# Patient Record
Sex: FEMALE | URBAN - METROPOLITAN AREA
[De-identification: names, ages, dates, MRNs, and addresses within clinical notes are randomized per-mention and may not be internally consistent; named-entity substitution may affect disease eponyms.]

---

## 2017-12-05 ENCOUNTER — EMERGENCY (EMERGENCY)
Facility: HOSPITAL | Age: 82
LOS: 1 days | Discharge: HOME | End: 2017-12-05
Attending: EMERGENCY MEDICINE | Admitting: EMERGENCY MEDICINE
Payer: MEDICARE

## 2017-12-05 VITALS
RESPIRATION RATE: 16 BRPM | TEMPERATURE: 98 F | SYSTOLIC BLOOD PRESSURE: 133 MMHG | WEIGHT: 117.95 LBS | HEART RATE: 108 BPM | DIASTOLIC BLOOD PRESSURE: 65 MMHG | OXYGEN SATURATION: 98 % | HEIGHT: 62 IN

## 2017-12-05 PROBLEM — Z00.00 ENCOUNTER FOR PREVENTIVE HEALTH EXAMINATION: Status: ACTIVE | Noted: 2017-12-05

## 2017-12-05 PROCEDURE — 99283 EMERGENCY DEPT VISIT LOW MDM: CPT

## 2017-12-05 RX ADMIN — Medication 20 MILLIGRAM(S): at 13:56

## 2017-12-05 NOTE — ED PROVIDER NOTE - OBJECTIVE STATEMENT
87 yo white female, just finished last dose of Augmentin yesterday for right ear infection and awoke this morning with rash to chest, abdomen and back as well as extremities. No fever, chills or SOB. Also full feeling in right ear.

## 2017-12-05 NOTE — ED ADULT NURSE NOTE - OBJECTIVE STATEMENT
received pt in bed #2 Pt A&O c/o right ear pain x 2 weeks just completed Augmentin yesterday Pt also w/ small rash on abdomen Seen by Dr Davis steroids given as charted per order

## 2017-12-10 ENCOUNTER — INPATIENT (INPATIENT)
Facility: HOSPITAL | Age: 82
LOS: 7 days | Discharge: HOME | DRG: 884 | End: 2017-12-18
Attending: INTERNAL MEDICINE | Admitting: INTERNAL MEDICINE
Payer: MEDICARE

## 2017-12-10 VITALS
RESPIRATION RATE: 20 BRPM | TEMPERATURE: 98 F | DIASTOLIC BLOOD PRESSURE: 78 MMHG | OXYGEN SATURATION: 100 % | SYSTOLIC BLOOD PRESSURE: 144 MMHG | HEART RATE: 90 BPM

## 2017-12-10 DIAGNOSIS — F29 UNSPECIFIED PSYCHOSIS NOT DUE TO A SUBSTANCE OR KNOWN PHYSIOLOGICAL CONDITION: ICD-10-CM

## 2017-12-10 DIAGNOSIS — R44.3 HALLUCINATIONS, UNSPECIFIED: ICD-10-CM

## 2017-12-10 DIAGNOSIS — R51 HEADACHE: ICD-10-CM

## 2017-12-10 DIAGNOSIS — Z90.49 ACQUIRED ABSENCE OF OTHER SPECIFIED PARTS OF DIGESTIVE TRACT: Chronic | ICD-10-CM

## 2017-12-10 DIAGNOSIS — R27.0 ATAXIA, UNSPECIFIED: ICD-10-CM

## 2017-12-10 LAB
ALBUMIN SERPL ELPH-MCNC: 4.2 G/DL — SIGNIFICANT CHANGE UP (ref 3.3–5)
ALP SERPL-CCNC: 77 U/L — SIGNIFICANT CHANGE UP (ref 40–120)
ALT FLD-CCNC: 14 U/L RC — SIGNIFICANT CHANGE UP (ref 10–45)
ANION GAP SERPL CALC-SCNC: 15 MMOL/L — SIGNIFICANT CHANGE UP (ref 5–17)
AST SERPL-CCNC: 18 U/L — SIGNIFICANT CHANGE UP (ref 10–40)
BASOPHILS # BLD AUTO: 0.1 K/UL — SIGNIFICANT CHANGE UP (ref 0–0.2)
BASOPHILS NFR BLD AUTO: 1.2 % — SIGNIFICANT CHANGE UP (ref 0–2)
BILIRUB SERPL-MCNC: 0.4 MG/DL — SIGNIFICANT CHANGE UP (ref 0.2–1.2)
BUN SERPL-MCNC: 16 MG/DL — SIGNIFICANT CHANGE UP (ref 7–23)
CALCIUM SERPL-MCNC: 9.6 MG/DL — SIGNIFICANT CHANGE UP (ref 8.4–10.5)
CHLORIDE SERPL-SCNC: 104 MMOL/L — SIGNIFICANT CHANGE UP (ref 96–108)
CO2 SERPL-SCNC: 20 MMOL/L — LOW (ref 22–31)
CREAT SERPL-MCNC: 0.9 MG/DL — SIGNIFICANT CHANGE UP (ref 0.5–1.3)
EOSINOPHIL # BLD AUTO: 0.1 K/UL — SIGNIFICANT CHANGE UP (ref 0–0.5)
EOSINOPHIL NFR BLD AUTO: 0.7 % — SIGNIFICANT CHANGE UP (ref 0–6)
GLUCOSE SERPL-MCNC: 93 MG/DL — SIGNIFICANT CHANGE UP (ref 70–99)
HCT VFR BLD CALC: 43.1 % — SIGNIFICANT CHANGE UP (ref 34.5–45)
HGB BLD-MCNC: 14.8 G/DL — SIGNIFICANT CHANGE UP (ref 11.5–15.5)
LYMPHOCYTES # BLD AUTO: 2.2 K/UL — SIGNIFICANT CHANGE UP (ref 1–3.3)
LYMPHOCYTES # BLD AUTO: 26.2 % — SIGNIFICANT CHANGE UP (ref 13–44)
MCHC RBC-ENTMCNC: 31.2 PG — SIGNIFICANT CHANGE UP (ref 27–34)
MCHC RBC-ENTMCNC: 34.3 GM/DL — SIGNIFICANT CHANGE UP (ref 32–36)
MCV RBC AUTO: 90.7 FL — SIGNIFICANT CHANGE UP (ref 80–100)
MONOCYTES # BLD AUTO: 0.8 K/UL — SIGNIFICANT CHANGE UP (ref 0–0.9)
MONOCYTES NFR BLD AUTO: 9.4 % — SIGNIFICANT CHANGE UP (ref 2–14)
NEUTROPHILS # BLD AUTO: 5.2 K/UL — SIGNIFICANT CHANGE UP (ref 1.8–7.4)
NEUTROPHILS NFR BLD AUTO: 62.5 % — SIGNIFICANT CHANGE UP (ref 43–77)
PLATELET # BLD AUTO: 288 K/UL — SIGNIFICANT CHANGE UP (ref 150–400)
POTASSIUM SERPL-MCNC: 3.9 MMOL/L — SIGNIFICANT CHANGE UP (ref 3.5–5.3)
POTASSIUM SERPL-SCNC: 3.9 MMOL/L — SIGNIFICANT CHANGE UP (ref 3.5–5.3)
PROT SERPL-MCNC: 7.4 G/DL — SIGNIFICANT CHANGE UP (ref 6–8.3)
RBC # BLD: 4.75 M/UL — SIGNIFICANT CHANGE UP (ref 3.8–5.2)
RBC # FLD: 11.8 % — SIGNIFICANT CHANGE UP (ref 10.3–14.5)
SODIUM SERPL-SCNC: 139 MMOL/L — SIGNIFICANT CHANGE UP (ref 135–145)
WBC # BLD: 8.3 K/UL — SIGNIFICANT CHANGE UP (ref 3.8–10.5)
WBC # FLD AUTO: 8.3 K/UL — SIGNIFICANT CHANGE UP (ref 3.8–10.5)

## 2017-12-10 PROCEDURE — 99285 EMERGENCY DEPT VISIT HI MDM: CPT | Mod: 25,GC

## 2017-12-10 PROCEDURE — 71010: CPT | Mod: 26

## 2017-12-10 PROCEDURE — 93010 ELECTROCARDIOGRAM REPORT: CPT

## 2017-12-10 PROCEDURE — 70450 CT HEAD/BRAIN W/O DYE: CPT | Mod: 26

## 2017-12-10 RX ORDER — SODIUM CHLORIDE 9 MG/ML
500 INJECTION INTRAMUSCULAR; INTRAVENOUS; SUBCUTANEOUS ONCE
Qty: 0 | Refills: 0 | Status: COMPLETED | OUTPATIENT
Start: 2017-12-10 | End: 2017-12-10

## 2017-12-10 RX ORDER — ACETAMINOPHEN 500 MG
1000 TABLET ORAL ONCE
Qty: 0 | Refills: 0 | Status: COMPLETED | OUTPATIENT
Start: 2017-12-10 | End: 2017-12-10

## 2017-12-10 RX ORDER — PREGABALIN 225 MG/1
1 CAPSULE ORAL
Qty: 0 | Refills: 0 | COMMUNITY

## 2017-12-10 RX ORDER — HALOPERIDOL DECANOATE 100 MG/ML
0.25 INJECTION INTRAMUSCULAR EVERY 6 HOURS
Qty: 0 | Refills: 0 | Status: DISCONTINUED | OUTPATIENT
Start: 2017-12-10 | End: 2017-12-11

## 2017-12-10 RX ORDER — DONEPEZIL HYDROCHLORIDE 10 MG/1
5 TABLET, FILM COATED ORAL AT BEDTIME
Qty: 0 | Refills: 0 | Status: DISCONTINUED | OUTPATIENT
Start: 2017-12-10 | End: 2017-12-18

## 2017-12-10 RX ORDER — CHOLECALCIFEROL (VITAMIN D3) 125 MCG
1 CAPSULE ORAL
Qty: 0 | Refills: 0 | COMMUNITY

## 2017-12-10 RX ORDER — HALOPERIDOL DECANOATE 100 MG/ML
2 INJECTION INTRAMUSCULAR ONCE
Qty: 0 | Refills: 0 | Status: COMPLETED | OUTPATIENT
Start: 2017-12-10 | End: 2017-12-10

## 2017-12-10 RX ORDER — MECLIZINE HCL 12.5 MG
12.5 TABLET ORAL
Qty: 0 | Refills: 0 | Status: DISCONTINUED | OUTPATIENT
Start: 2017-12-10 | End: 2017-12-15

## 2017-12-10 RX ADMIN — HALOPERIDOL DECANOATE 0.25 MILLIGRAM(S): 100 INJECTION INTRAMUSCULAR at 22:52

## 2017-12-10 RX ADMIN — SODIUM CHLORIDE 500 MILLILITER(S): 9 INJECTION INTRAMUSCULAR; INTRAVENOUS; SUBCUTANEOUS at 16:05

## 2017-12-10 RX ADMIN — HALOPERIDOL DECANOATE 2 MILLIGRAM(S): 100 INJECTION INTRAMUSCULAR at 21:23

## 2017-12-10 NOTE — ED BEHAVIORAL HEALTH ASSESSMENT NOTE - DIFFERENTIAL
psychotic d/o unspecified, r/o psychotic d/o 2/2 GMC, r/o delirium, r/o dementia with psychosis, r/o paranoid PD, r/o delusional disorder, r/o schizophrenia

## 2017-12-10 NOTE — ED ADULT TRIAGE NOTE - CHIEF COMPLAINT QUOTE
R ear pain/head pain s/p a Doctor at Urgent Care who "cleaned my ear too hard" on 11/26. Pt was on Amoxicillin but it gave her a rash.

## 2017-12-10 NOTE — ED PROVIDER NOTE - OBJECTIVE STATEMENT
Private Physician None  86y female pmh SP resection of colon ca/polyp abd 78,No chemo/rt, HLD Not treated. renal colic, No dm,htn,habits, travel.pt comes to ed complains of ha/rt ear pain. Onset 2 weeks ago. Intermittant with rad to parietal scalp down neck. Pain mod severe. Took advil without relief once. Seen by ENT and told ear was normal. No precipitating alleviating factors. No fevers chills. No runny nose. CP/cough/sputm,nvdc. Lost several pounds over past few weeks. Pt was treated with amoxil and seen subsequently at :[facility] with allergic reaction.

## 2017-12-10 NOTE — ED BEHAVIORAL HEALTH ASSESSMENT NOTE - PSYCHIATRIC ISSUES AND PLAN (INCLUDE STANDING AND PRN MEDICATION)
Workup: Check TSH, B12, folate, RPR, MRI, EEG, UA, ESR, CRP, paraneoplastic panel.  Check EKG; if QTc <500, can start Risperdal 0.25mg PO qHS.  PRN: Haldol 0.25mg IV q6h PRN agitation, monitor EKG

## 2017-12-10 NOTE — H&P ADULT - NSHPLABSRESULTS_GEN_ALL_CORE
14.8   8.3   )-----------( 288      ( 10 Dec 2017 16:17 )             43.1   12-10    139  |  104  |  16  ----------------------------<  93  3.9   |  20<L>  |  0.90    Ca    9.6      10 Dec 2017 16:17    TPro  7.4  /  Alb  4.2  /  TBili  0.4  /  DBili  x   /  AST  18  /  ALT  14  /  AlkPhos  77  12-10

## 2017-12-10 NOTE — ED PROVIDER NOTE - CARE PLAN
Principal Discharge DX:	Intractable episodic headache, unspecified headache type  Secondary Diagnosis:	Ataxia  Secondary Diagnosis:	Hallucinations

## 2017-12-10 NOTE — ED PROVIDER NOTE - RATE
[facility]    Chief Complaint   Patient presents with     Hospital F/U       HPI:    Zahira Sutherland is a 69 year old  (1948), who is being seen today for an episodic care visit at [facility].  HPI information obtained from: facility chart records.  Today's concern is:this was an f/u visit from the hospital rtn on 11-6-2017.  She was hospitalized on 11-2-2017 2nd to new L sided weakness  Advance Care Planning  I was informed by the Hospitalist to have a POLST discussion with her sister.      Early onset Alzheimer's disease with behavioral disturbance> was in [facility] spring [facility]  The dementia is progressing quickly    Gibberish aphasia  Zahira is not able to express her wishes or needs in a clear manner    BMI 40.0-44.9, adult (H)  She still enjoys eating and has not able to burn off excess calories    Seizure (H)  Since she is on the Keppra she has been free of seizures    Weakness of left side of body  She is still leaning to the L    Dysphagia, unspecified type  She needs assist with eating and the food texture has been adjusted to her needs      ALLERGIES: Penicillins  Past Medical, Surgical, Family and Social History reviewed and updated in [facility].    Current Outpatient Prescriptions   Medication Sig Dispense Refill     nystatin (NY STOP) [id number] UNIT/GM POWD Apply topically 2 times daily Apply to all red skin folds for Intertrigo Abd,folds and Axilla also use under breasts; No stop date       [START ON 11/9/2017] Olanzapine (ZYPREXA PO) Take 1.25 mg  by mouth daily (with breakfast)       bisacodyl (DULCOLAX) 10 MG Suppository Place 10 mg rectally as needed for constipation       Levetiracetam 1000 MG TABS Take 1,000 mg by mouth 2 times daily 60 tablet 0     aspirin 81 MG chewable tablet Take 81 mg by mouth daily       GABAPENTIN PO Take 100 mg by mouth 3 times daily       OLANZAPINE PO Take 2.5 mg by mouth At Bedtime        DULOXETINE HCL PO Take 30 mg by  "mouth daily       cholecalciferol 2000 UNITS CAPS Take 1 capsule by mouth daily       Multiple Vitamin (MULTIVITAMIN) per tablet Take 1 tablet by mouth daily. 90 tablet 3     Medications reviewed:  Medications reconciled to facility chart and changes were made to reflect current medications as identified as above med list. Below are the changes that were made:   Medications stopped since last EPIC medication reconciliation:   There are no discontinued medications.    Medications started since last [facility] medication reconciliation:  Orders Placed This Encounter   Medications     nystatin (NY STOP) [id number] UNIT/GM POWD     Sig: Apply topically 2 times daily Apply to all red skin folds for Intertrigo Abd,folds and Axilla also use under breasts; No stop date       REVIEW OF SYSTEMS:  Unobtainable secondary to cognitive impairment or aphasia.  She looked at me, but did not respond to questions    Physical Exam:  /68  Pulse 62  Temp 97.8  F (36.6  C)  Resp 16  Ht 5' 6\" (1.676 m)  Wt 248 lb 3.2 oz (112.6 kg)  SpO2 95%  BMI 40.06 kg/m2  GENERAL APPEARANCE:  Alert, > but non verbal  EYES:  Alert, > nice eye contact  RESP:  respiratory effort and palpation of chest normal, lungs clear to auscultation , no respiratory distress  CV:  Palpation and auscultation of heart done , regular rate and rhythm, no murmur, rub, or gallop, no edema  ABDOMEN:  normal bowel sounds, soft, nontender, no hepatosplenomegaly or other masses, diff to examine 2nd to obesely  M/S:   Gait and station abnormal >Depends on staff for transfers, is transported by staff with a w/c  NEURO:   Cranial nerves 2-12 are normal tested and grossly at patient's baseline, leans to the L while sitting in the w/c  PSYCH:  Was non verbal, she did have a sad look on her face     BP 10/08-11/07: 101-124/54-83 mmHg    Recent Labs:    CBC RESULTS:   Recent Labs   Lab Test  11/03/17   1415  10/04/17   0305   WBC  7.7  10.7   RBC  4.71  4.12   HGB  15.2  13.0   HCT "  46.1  40.7   MCV  98  99   MCH  32.3  31.6   MCHC  33.0  31.9   RDW  13.4  13.9   PLT  161  151       Last Basic Metabolic Panel:  Recent Labs   Lab Test  11/03/17   1415  10/04/17   0305   NA  144  141   POTASSIUM  4.0  5.0   CHLORIDE  107  111*   EVY  9.5  8.0*   CO2  30  30   BUN  14  27   CR  0.90  0.78   GLC  109*  82       Liver Function Studies -   Recent Labs   Lab Test  10/04/17   0305  09/29/17   0858   PROTTOTAL  6.0*  6.2*   ALBUMIN  2.5*  2.8*   BILITOTAL  0.4  0.8   ALKPHOS  69  86   AST  41  13   ALT  48  24       TSH   Date Value Ref Range Status   11/03/2017 3.20 0.40 - 4.00 mU/L Final   09/29/2017 2.49 0.40 - 4.00 mU/L Final       Lab Results   Component Value Date    A1C 5.6 11/03/2017    A1C 5.5 05/31/2017       Assessment/Plan:  Advance Care Planning  I had an approximately 20 min conversation on the phone to discuss the details of the POLST  I had called her earlier and she contacted all the sibs   They all agreed to focus more on comfort vs prolonging life at all cost  POLST completed. Click on the code status to view the scanned copy.   Brianna Rosaripee Zak  11/8/2017    Early onset Alzheimer's disease with behavioral disturbance> was [facility]  Dementia is progressing rapidly    Gibberish aphasia  She was non verbal today    BMI 40.0-44.9, adult (H)  She has not way to burn up extra calories  I did talk with her sister, that persons with advanced dementia often do not know anymore what to do with food.  I will not be surprised if the looses weight.    Seizure (H)  Will cont with current Keppra    Weakness of left side of body  Not sure what the cause it  CVA was rule out during the hospital stay  I did talk with nursing about getting a w/c with better lateral support  I will also decrease the Zyprexa to 1.25 mg in the am  Will reassess again and have nursing monitor her behaviors    Dysphagia, unspecified type  I did talk at length with her sister about the  high risk of aspiration 2nd to end stage dementia  She did express her wishes for Zahira as we completed the POLST.  The overall focus is on comfort care.      Orders:  See A&P    Total time spent with patient visit at the skilled nursing facility was 45 min including patient visit, review of past records and phone call to patient contact. Greater than 50% of total time spent with counseling and coordinating care due to complexity of care and 2 phone call to address and complete the POLST    Electronically signed by  GABRIELE Brooks CNP                   74

## 2017-12-10 NOTE — H&P ADULT - HISTORY OF PRESENT ILLNESS
86 year old female presents to the ED with R ear/head/jaw pain for 3-4 weeks. 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 Patient states she goes to an urgent care regularly to get her ear wax cleared. 3-4 weeks ago, the urgent care “cleaning the barbara out w/ water and then a sharp instrument” and since then she has been having constant ear pain which is constant, radiates from the R ear to her head (R temporal region) and jaw. She was given a prescription of amoxicillin and ear drops however patient developed a rash and stopped taking the medication. Patient denies numbness, weakness, headache, vomiting, dizziness, or nausea, but reports feeling repulsed by food and weight loss of 7lbs in 1 week.

## 2017-12-10 NOTE — ED BEHAVIORAL HEALTH ASSESSMENT NOTE - SUMMARY
87 y/o  F, domiciled alone in [facility], two children, with PMHx of colon cancer s/p resection, renal colic, and HLD, PPHx of psychotherapy with  discontinued one year ago, no formal dx, no psych meds, no psych hosp, no SA, no substance abuse, p/w three weeks of severe ear pain to [facility] ED. Daughter informed ED staff that pt has been paranoid and c/o possible AH of chirping noises in her apt for the past six years: [facility] Psych consulted to evaluate. On interview, pt is calm, mostly cooperative, talkative, euthymic, and A&Ox4/4. Pt denies mood syx, S/H/I/I/P, and ongoing AH/VH in ED. Endorses persecutory/paranoid delusions regarding neighbor. No violence/agitation. States she has "short-term memory problems" but denies major impairment; refused 3 object recall and appears evasive/guarded about memory. Denies problems with finances or ADLs. Per collateral from daughter, pt has a h/o paranoid ideation and has experienced a cognitive decline with increasing paranoia and possible AH in the last six years. Per daughter, pt minimizes and denies syx, refuses cognitive evaluations, and refuses psych medications (2/2 fear they will sedate her). 87 y/o  F, domiciled alone in [facility], two children, with PMHx of colon cancer s/p resection, renal colic, and HLD, PPHx of psychotherapy with  discontinued one year ago, no formal dx, no psych meds, no psych hosp, no SA, no substance abuse, p/w three weeks of severe ear pain to [facility] ED. Daughter informed ED staff that pt has been paranoid and c/o possible AH of chirping noises in her apt for the past six years: [facility] Psych consulted to evaluate. On interview, pt is calm, mostly cooperative, talkative, euthymic, and A&Ox4/4. Pt denies mood syx, S/H/I/I/P, and ongoing AH/VH in ED. Endorses persecutory/paranoid delusions regarding neighbor. No violence/agitation. States she has "short-term memory problems" but denies major impairment; refused 3 object recall and appears evasive/guarded about memory. Denies problems with finances or ADLs. Per collateral from daughter, pt has a h/o paranoid ideation and has experienced a cognitive decline with increasing paranoia and possible AH in the last 6-10 years. Per daughter, pt minimizes and denies syx, refuses cognitive evaluations, and refuses psych medications (2/2 fear they will sedate her).

## 2017-12-10 NOTE — ED BEHAVIORAL HEALTH ASSESSMENT NOTE - HPI (INCLUDE ILLNESS QUALITY, SEVERITY, DURATION, TIMING, CONTEXT, MODIFYING FACTORS, ASSOCIATED SIGNS AND SYMPTOMS)
85 y/o  F, domiciled alone in [facility], two children, with PMHx of colon cancer s/p resection, renal colic, and HLD, PPHx of psychotherapy with  discontinued one year ago, no formal dx, no psych meds, no psych hosp, no SA, no substance abuse, p/w three weeks of severe ear pain to [facility] ED. Daughter informed ED staff that pt has been paranoid and c/o possible AH of chirping noises in her apt for the past six years: [facility] Psych consulted to evaluate. On interview, pt states she is "wonderful, happy" and denies depression and anxiety. Is preoccupied with upstairs neighbor who she believes is persecuting her by playing chirping noises to keep her awake. States the noises are "not natural" and rejects daughter's suggestion that she is hearing crickets. States the neighbor downloaded the noises "from the internet." States that this neighbor is deliberately acting against her and "everyone knows what she's doing to me." At first states that her sleep is poor because she is kept awake by the noises and then states she sleeps "perfectly." She also states the neighbor is trying to get her to "change to [the neighbor's] lifestyle," which she states is drinking and staying up all night. States the neighbor tried to "take [her] chains." States she believes the neigbhor has targeted her because the neighbor is "fat" and the pt is "thin" and "can eat what [she] wants." Denies SI/I/P, stating "I lived through the war" and lived through "bombs." Rejects that her experience was traumatic, stating "I have no trauma." Denies HI/I/P towards neighbor. States she has "short-term memory problems" but states she passed a memory evaluation "with flying colors." Denies AH. States she has never heard "chirping" that she attributes to neighbor outside of her house. Denies hx of psychiatric diagnosis or past psychiatric treatment. Denies tobacco, alcohol, and illicit drugs.    Per collateral from daughter, pt has been "paranoid" her entire life, believing "everyone was against her." States pt was isolative with no friends, "did not do well in one to one relationships," and was "very cruel" to her and her brother. States that her brother and her own daughter are estranged from the pt as a consequence. Denies h/o physical abuse from pt as well as h/o violence or agitation. States that although pt continues to cook, bake, drive, take care of finances, clean her home, and perform her ADLs that she believes pt is less functional that she appears. States that pt has been c/o chirping noises played by the neighbor for six years and states that pt sometimes states neighbor is "listening outside her door." States that she feels pt has had an acute decline in the past six years and has become more forgetful and more paranoid. States pt "does not believe anything is wrong with her," has refused cognitive evaluations, and has refused psychiatric medications. States pt saw psychiatrist once a year ago and never went back. States pt stopped seeing  for psychotherapy one year ago because the  could not help the pt with her neighbor. States there is a family h/o dementia in pt's mother and sister and alcoholism and completed suicide in pt's father. Also states there is a h/o "depression" and possible "paranoid schizophrenia" in family. 85 y/o  F, domiciled alone in [facility], two children, with PMHx of colon cancer s/p resection, renal colic, and HLD, PPHx of psychotherapy with  discontinued one year ago, no formal dx, no psych meds, no psych hosp, no SA, no substance abuse, p/w three weeks of severe ear pain to [facility] ED. Daughter informed ED staff that pt has been paranoid and c/o possible AH of chirping noises in her apt for the past six years: [facility] Psych consulted to evaluate. On interview, pt states she is "wonderful, happy" and denies depression and anxiety. Is preoccupied with upstairs neighbor who she believes is persecuting her by playing chirping noises to keep her awake. States the noises are "not natural" and rejects daughter's suggestion that she is hearing crickets. States the neighbor downloaded the noises "from the internet." States that this neighbor is deliberately acting against her and "everyone knows what she's doing to me." At first states that her sleep is poor because she is kept awake by the noises and then states she sleeps "perfectly." She also states the neighbor is trying to get her to "change to [the neighbor's] lifestyle," which she states is drinking and staying up all night. States the neighbor tried to "take [her] chains." States she believes the neigbhor has targeted her because the neighbor is "fat" and the pt is "thin" and "can eat what [she] wants." Denies SI/I/P, stating "I lived through the war" and lived through "bombs." Rejects that her experience was traumatic, stating "I have no trauma." Denies HI/I/P towards neighbor. States she has "short-term memory problems" but states she passed a memory evaluation "with flying colors." Denies AH. States she has never heard "chirping" that she attributes to neighbor outside of her house. Denies hx of psychiatric diagnosis or past psychiatric treatment. Denies tobacco, alcohol, and illicit drugs.    Per collateral from daughter, pt has been "paranoid" her entire life, believing "everyone was against her." States pt was isolative with no friends, "did not do well in one to one relationships," and was "very cruel" to her and her brother. States that her brother and her own daughter are estranged from the pt as a consequence. Denies h/o physical abuse from pt as well as h/o violence or agitation. States that although pt continues to cook, bake, drive, take care of finances, clean her home, and perform her ADLs that she believes pt is less functional that she appears. States that pt has been c/o chirping noises played by the neighbor for six years and states that pt sometimes states neighbor is "listening outside her door." States that she feels pt has had an acute decline in the past 6-10 years and has become more forgetful and more paranoid. States pt "does not believe anything is wrong with her," has refused cognitive evaluations, and has refused psychiatric medications. States pt saw psychiatrist once a year ago and never went back. States pt stopped seeing  for psychotherapy one year ago because the  could not help the pt with her neighbor. States there is a family h/o dementia in pt's mother and sister and alcoholism and completed suicide in pt's father. Also states there is a h/o "depression" and possible "paranoid schizophrenia" in family.

## 2017-12-10 NOTE — ED BEHAVIORAL HEALTH ASSESSMENT NOTE - CASE SUMMARY
86F , domiciled alone, retired, independent with ADLs, no formal PPHx, PMH significant for colon ca and DLD, no active substance abuse p/w sharp ear pain after cerumen debridement in urgent care, daughter requesting psych consult for 10 years of ongoing paranoia and now with increasing A/H of chirping sounds, convinced her upstairs neighbor is doing it to upset her.  Pt AOx3 on exam, well groomed, pleasant and talkative, frequently tries to redirect the conversation away from cognitive testing.  Can spell WORLD backwards, but when memory tested pt decliend to participate; states a neurologist told her she has "short term memory loss."  Still drives, tends to own bills and finances.  Goes on about how her neighbor stole her chain, is awake all night to disturb her, plays a "cricket chirping sound" that is "mechanical" that she probably recorded off the internet with the intention of bothering her.  Pt with full delusional conviction.  Denies SIIP/HIIP or substance abuse.  Denies hearing A/H outside of the apt.  Pt repeatedly states "I am not crazy".  Collateral from daughter who describes pt as having chronic personality changes r/t her father's suicide and being a WW2 survivor.  She states pt has always had a paranoid nature, felt people did things purposely against her.  She states in the past 10 years or so pt has become increasingly paranoid, delusional, now hallucinating.  Staets the sx are more intense.  Also feels worried that the pt has some cognitive decline, is not as able to tend to ADLs and personal finances as she claims, even though she does continue driving and living alone, appears to get the bills in on time.  Dx: Psychotic d/o NOS.  Although pt's daughter describes chronic "metnal illness" in her words, acuity of current delusions and hallucinations are atypical in a patient this age and require further medical workup.  Workup: Check TSH, B12, folate, RPR, MRI, EEG, UA, ESR, CRP, paraneoplastic panel.  Check EKG; if QTc <500, can start Risperdal 0.25mg PO qHS.  FDA BB warning has been d/w pt's daughter.  PRN: Haldol 0.25mg IV q6h PRN agitation, monitor EKG.  CL psych will f/u.

## 2017-12-10 NOTE — ED BEHAVIORAL HEALTH ASSESSMENT NOTE - DESCRIPTION
calm and cooperative, talkative and attempts to redirect the conversation multiple times SP resection of colon ca/polyp abd 78,No chemo/rt, HLD Not treated. renal colic, Retired, domiciled alone in private residence, two adult children, , no substances.

## 2017-12-10 NOTE — ED PROVIDER NOTE - PROGRESS NOTE DETAILS
- MD Varsha,PhD - Resident: Neuro and psych saw patient, appreciate their recs, spoke to Hospitalist Dr. Pereira who will accept patient for dementia workup and MRI for ear pain and ataxia.

## 2017-12-10 NOTE — ED ADULT NURSE REASSESSMENT NOTE - NS ED NURSE REASSESS COMMENT FT1
Patient sitting in chair with daughter at bedside.  Patient c/o pain on the right side of her head and is refusing all pain medication.  Patient educated on plan of care and admission and patient refusing medication.

## 2017-12-10 NOTE — ED ADULT NURSE REASSESSMENT NOTE - NS ED NURSE REASSESS COMMENT FT1
Patient sitting in stretcher with family at bedside.  Patient spoke with MD Sanchez and RN with family present and patient said she does not want to stay,  Plan of care explained to patient and still refusing to go upstairs.

## 2017-12-10 NOTE — ED BEHAVIORAL HEALTH ASSESSMENT NOTE - DETAILS
sharp ear pain CL aware . Father: alcoholism, completed suicide. Mother, sister: dementia (sister end-stage, bedbound). H/o "depression" and possible "paranoid schizophrenia." WW2 survivor

## 2017-12-10 NOTE — ED PROVIDER NOTE - MEDICAL DECISION MAKING DETAILS
PT with ha for past two weeks rad from ear to head, check labs, ct head, labs,   Antwan Aguilera MD, Facep

## 2017-12-10 NOTE — ED BEHAVIORAL HEALTH ASSESSMENT NOTE - OTHER PAST PSYCHIATRIC HISTORY (INCLUDE DETAILS REGARDING ONSET, COURSE OF ILLNESS, INPATIENT/OUTPATIENT TREATMENT)
no formal PPHx, no psych admissions, no SA    daughter suspects chronic personality changes r/t lifetime h/o trauma, longstanding irritability, unstable relationships, paranoid personality style always assuming people had the worst intentions towards her

## 2017-12-10 NOTE — H&P ADULT - NSHPPHYSICALEXAM_GEN_ALL_CORE
pt. seen and examined, poor historian     Vital Signs Last 24 Hrs  T(C): 36.6 (10 Dec 2017 19:50), Max: 36.7 (10 Dec 2017 14:47)  T(F): 97.8 (10 Dec 2017 19:50), Max: 98 (10 Dec 2017 14:47)  HR: 87 (10 Dec 2017 19:50) (72 - 90)  BP: 152/97 (10 Dec 2017 19:50) (144/78 - 152/97)  BP(mean): --  RR: 16 (10 Dec 2017 19:50) (16 - 20)  SpO2: 100% (10 Dec 2017 19:50) (100% - 100%)    heent: B/L ear no d/c   neck; supple, no JVD  Lungs: B/L clear  heart: s1s2 nml  abd: soft, NABS, NT/ND  ext: no e/c/c  neuro: aaox2, move all ext

## 2017-12-10 NOTE — CHART NOTE - NSCHARTNOTEFT_GEN_A_CORE
Medicine NP    Patient placed on one to one to prevent from harming self and others .  Continuous monitoring in place. Will endorse to  primary team in the Am.   Maximo Gross [facility]-BC #[id number]

## 2017-12-10 NOTE — ED ADULT NURSE NOTE - CHPI ED SYMPTOMS NEG
no change in level of consciousness/no dizziness/no numbness/no blurred vision/no confusion/no loss of consciousness/no nausea/no fever/no weakness/no vomiting

## 2017-12-10 NOTE — ED ADULT NURSE NOTE - OBJECTIVE STATEMENT
86y female presents to ED ambulatory and a/ox3 complaining of R ear pain. Pt states that she gets her cerumen taken out every couple months and this past month she felt that the doctor went too deep causing her pain , which was abnormal. Since then she has had intermittent aching ear pain radiating to head, neck and jaw, 6/10. PT states that she was treated for an ear infection with amoxacillin. Pt states there was no relief after amoxicillin and that the pain persists. Pt has gone to multiple hospitals, last week she was at [facility] that cleared her stating her ears did not show an infection. Pt has equal strength in upper and lower extremities, PERRL present, denies numbness tingling and weakness. Pt denies chest pain and SOB, denies n/v/d, denies dysuria, denies dizziness.

## 2017-12-10 NOTE — ED ADULT NURSE REASSESSMENT NOTE - NS ED NURSE REASSESS COMMENT FT1
Pt resting comfortably in bed with family at bedside, vitals stable. Pt denies any pain at this time. Psych and Neuro consults completed. Pt admitted to [facility] for unspecified headache and ataxia.

## 2017-12-11 DIAGNOSIS — F02.81 DEMENTIA IN OTHER DISEASES CLASSIFIED ELSEWHERE, UNSPECIFIED SEVERITY, WITH BEHAVIORAL DISTURBANCE: ICD-10-CM

## 2017-12-11 DIAGNOSIS — H92.01 OTALGIA, RIGHT EAR: ICD-10-CM

## 2017-12-11 LAB
ANION GAP SERPL CALC-SCNC: 17 MMOL/L — SIGNIFICANT CHANGE UP (ref 5–17)
BUN SERPL-MCNC: 15 MG/DL — SIGNIFICANT CHANGE UP (ref 7–23)
CALCIUM SERPL-MCNC: 9.3 MG/DL — SIGNIFICANT CHANGE UP (ref 8.4–10.5)
CHLORIDE SERPL-SCNC: 106 MMOL/L — SIGNIFICANT CHANGE UP (ref 96–108)
CO2 SERPL-SCNC: 14 MMOL/L — LOW (ref 22–31)
CREAT SERPL-MCNC: 0.85 MG/DL — SIGNIFICANT CHANGE UP (ref 0.5–1.3)
CRP SERPL-MCNC: 0.2 MG/DL — SIGNIFICANT CHANGE UP (ref 0–0.4)
ERYTHROCYTE [SEDIMENTATION RATE] IN BLOOD: 20 MM/HR — SIGNIFICANT CHANGE UP (ref 0–20)
FOLATE SERPL-MCNC: 6.1 NG/ML — SIGNIFICANT CHANGE UP (ref 4.8–24.2)
GLUCOSE SERPL-MCNC: 67 MG/DL — LOW (ref 70–99)
HCT VFR BLD CALC: 38.5 % — SIGNIFICANT CHANGE UP (ref 34.5–45)
HGB BLD-MCNC: 13 G/DL — SIGNIFICANT CHANGE UP (ref 11.5–15.5)
MCHC RBC-ENTMCNC: 29.5 PG — SIGNIFICANT CHANGE UP (ref 27–34)
MCHC RBC-ENTMCNC: 33.8 GM/DL — SIGNIFICANT CHANGE UP (ref 32–36)
MCV RBC AUTO: 87.5 FL — SIGNIFICANT CHANGE UP (ref 80–100)
PLATELET # BLD AUTO: 262 K/UL — SIGNIFICANT CHANGE UP (ref 150–400)
POTASSIUM SERPL-MCNC: 4.5 MMOL/L — SIGNIFICANT CHANGE UP (ref 3.5–5.3)
POTASSIUM SERPL-SCNC: 4.5 MMOL/L — SIGNIFICANT CHANGE UP (ref 3.5–5.3)
RBC # BLD: 4.4 M/UL — SIGNIFICANT CHANGE UP (ref 3.8–5.2)
RBC # FLD: 13.7 % — SIGNIFICANT CHANGE UP (ref 10.3–14.5)
SODIUM SERPL-SCNC: 137 MMOL/L — SIGNIFICANT CHANGE UP (ref 135–145)
T PALLIDUM AB TITR SER: NEGATIVE — SIGNIFICANT CHANGE UP
TSH SERPL-MCNC: 2.42 UIU/ML — SIGNIFICANT CHANGE UP (ref 0.27–4.2)
VIT B12 SERPL-MCNC: 330 PG/ML — SIGNIFICANT CHANGE UP (ref 243–894)
WBC # BLD: 7.96 K/UL — SIGNIFICANT CHANGE UP (ref 3.8–10.5)
WBC # FLD AUTO: 7.96 K/UL — SIGNIFICANT CHANGE UP (ref 3.8–10.5)

## 2017-12-11 PROCEDURE — 99222 1ST HOSP IP/OBS MODERATE 55: CPT

## 2017-12-11 PROCEDURE — 99232 SBSQ HOSP IP/OBS MODERATE 35: CPT

## 2017-12-11 RX ORDER — HALOPERIDOL DECANOATE 100 MG/ML
1 INJECTION INTRAMUSCULAR EVERY 6 HOURS
Qty: 0 | Refills: 0 | Status: DISCONTINUED | OUTPATIENT
Start: 2017-12-11 | End: 2017-12-15

## 2017-12-11 RX ORDER — HALOPERIDOL DECANOATE 100 MG/ML
1 INJECTION INTRAMUSCULAR AT BEDTIME
Qty: 0 | Refills: 0 | Status: DISCONTINUED | OUTPATIENT
Start: 2017-12-11 | End: 2017-12-15

## 2017-12-11 RX ORDER — HEPARIN SODIUM 5000 [USP'U]/ML
5000 INJECTION INTRAVENOUS; SUBCUTANEOUS EVERY 12 HOURS
Qty: 0 | Refills: 0 | Status: DISCONTINUED | OUTPATIENT
Start: 2017-12-11 | End: 2017-12-11

## 2017-12-11 RX ORDER — HALOPERIDOL DECANOATE 100 MG/ML
0.5 INJECTION INTRAMUSCULAR
Qty: 0 | Refills: 0 | Status: DISCONTINUED | OUTPATIENT
Start: 2017-12-11 | End: 2017-12-15

## 2017-12-11 RX ADMIN — Medication 12.5 MILLIGRAM(S): at 08:42

## 2017-12-11 RX ADMIN — DONEPEZIL HYDROCHLORIDE 5 MILLIGRAM(S): 10 TABLET, FILM COATED ORAL at 21:16

## 2017-12-11 RX ADMIN — HALOPERIDOL DECANOATE 1 MILLIGRAM(S): 100 INJECTION INTRAMUSCULAR at 21:16

## 2017-12-11 NOTE — PROGRESS NOTE ADULT - PROBLEM SELECTOR PLAN 1
seen by neuro  pt. is being noncompliant with MRI, will cancel  -also talking to daughter : pt. has gradual worsening of memory and demntia features from last 6 yrs

## 2017-12-11 NOTE — CONSULT NOTE ADULT - SUBJECTIVE AND OBJECTIVE BOX
Neurology Consult    Reason for consult: Atypical headache    HPI: 86 year old female presents to the ED with R ear/head/jaw pain for 3-4 weeks. 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 Patient states she goes to an urgent care regularly to get her ear wax cleared. 3-4 weeks ago, the urgent care “cleaning the barbara out w/ water and then a sharp instrument” and since then she has been having constant ear pain which is constant, radiates from the R ear to her head (R temporal region) and jaw. She was given a prescription of amoxicillin and ear drops however patient developed a rash and stopped taking the medication. Patient denies numbness, weakness, headache, vomiting, dizziness, or nausea, but reports feeling repulsed by food and weight loss of 7lbs in 1 week.    REVIEW OF SYSTEMS:  Constitutional: No fever, chills, fatigue, weakness. 7 lb weight loss in a week.  Eyes: No eye pain, visual disturbances, or discharge.  ENT:  Hearing loss 40%. Chirping in ears. No vertigo. No sinus or throat pain.  Neck: Right sided neck pain.  Respiratory: No cough, dyspnea, wheezing.  Cardiovascular: No chest pain, palpitations.  Gastrointestinal: No abdominal pain. No nausea, vomiting, diarrhea, or constipation.   Genitourinary: No dysuria, frequency, hematuria or incontinence.  Neurological: Right sided headaches. No lightheadedness, vertigo, numbness or tremors.  Psychiatric: No depression, anxiety, mood swings, or difficulty sleeping.  Musculoskeletal: No joint pain or swelling. No muscle, back, or extremity pain.  Skin: No itching, burning, rashes or lesions.   Lymph Nodes: No enlarged glands  Endocrine: No heat or cold intolerance, no hair loss.  Allergy and Immunologic: No hives or eczema.    MEDICATIONS  None    PMH: Hyperlipidemia, dementia     PSH: S/P partial colectomy for ?colon cancer    FAMILY HISTORY:  No history of strokes or seizures     SOCIAL HISTORY:  No history of tobacco or alcohol use     Allergies  amoxicillin (Rash)  IV Contrast (Anaphylaxis)    Vital Signs Last 24 Hrs  T(C): 36.7 (10 Dec 2017 14:47), Max: 36.7 (10 Dec 2017 14:47)  T(F): 98 (10 Dec 2017 14:47), Max: 98 (10 Dec 2017 14:47)  HR: 90 (10 Dec 2017 14:47) (90 - 90)  BP: 144/78 (10 Dec 2017 14:47) (144/78 - 144/78)  RR: 20 (10 Dec 2017 14:47) (20 - 20)  SpO2: 100% (10 Dec 2017 14:47) (100% - 100%)    Neurological Examination:    Mental Status: Patient is alert, awake, oriented X3. Patient is fluent, no dysarthria, no aphasia. Follows commands well and able to answer questions appropriately. Mood and affect normal. Repetition intact. Cannot remember 3 words after 5 minutes.    Cranial Nerves: PERRL, EOMI, visual field intact, no papilledema. V1-V3 intact, no gross facial asymmetry, tongue/uvula midline  Pantoja lateralized to right  Rhinne normal    Motor Exam: No drift  Right upper extremity: 5/5  Left upper extremity: 5/5  Right lower extremity: 5/5  Left lower extremity: 5/5    Normal bulk/tone    Sensory: Intact to light touch bilaterally    Coordination: Finger to nose intact bilaterally    Gait: Normal     Reflexes: Bilateral 2+ Biceps, Brachial, Patellar, Ankle    GENERAL: No acute distress  HEENT:  Normocephalic, atraumatic. Bilateral ear canals erythematous. Typanic membrane intact and nonerythematous  LUNGS: Clear air entry bilaterally, no wheeze  HEART: Regular rate and rhythm, normal S1 S2, no murmur  ABDOMEN: Soft, nontender, nondistended  EXTREMITIES: No edema, clubbing, cyanosis  MUSCULOSKELETAL: Normal range of motion  SKIN: Oval skin lesion on back, possible seborrheic keratosis
CC: Ear pain x 1 week after ear cleaning    HPI: Patient is a 86y old  Female who presents with a chief complaint of Ear ache and confusion (10 Dec 2017 22:05), admitted to medicine team,    Pt states pain started after "they cleaned my ear at the Urgent Care center, they cut something in my ear" Pts daughter states pain starts in R Ear and radiates to head/jaw pt was placed on ABX but stopped them because she developed a rash. Pt was also seen and evaluated by ENT Dr Mcdaniels in [place], and advised patient and family source of pain is not otologic, may be muscular recommended Accupuncture. Pt also had an Audiogram this past Friday 12/8 and it revealed 40% hearing loss on the right side.  NO Modifying factors.  Pt denies hearing loss/tinnitus/drainage from ear/bleeding/vertigo/facial or jaw pain/Fevers/chills Dysphagia/odynophagia/hemoptysis/unintentional weight loss. Any other relevant history/symptoms.    EXAM: CT BRAIN    PROCEDURE DATE: 12/10/2017     INTERPRETATION: CLINICAL INDICATION: Altered mental status. Head pain.     TECHNIQUE: Axial CT of the brain was acquired from the vertex to the base of   the skull without IV contrast. Sagittal and coronal reformats were obtained.     COMPARISON: Brain CT from 11/11/2011     FINDINGS:     There is no evidence of acute intracranial hemorrhage. No mass effect is   evident in the brain. The basal cisterns are patent. The gray-white   differentiation is preserved. No extra axial collection is appreciated.     There is prominence of the ventricles and sulci compatible with   age-appropriate involution and patchy periventricular hypodensity without   mass effect consistent with chronic microvascular ischemic change. These   findings are progressed compared to 2011.     A chronic appearing lacunar infarct involves the right superior cerebellum,   with ex vacuo dilatation of an adjacent sulcus, best appreciated on the   sagittal series. The lacunar infarct is new compared with the 2011 head CT.     There is no depressed calvarial fracture. The visualized mastoid air cells   and paranasal sinuses are clear. The patient is status post bilateral   cataract surgery.       PAST MEDICAL & SURGICAL HISTORY:  Hyperlipidemia, unspecified hyperlipidemia type  S/P partial colectomy    Allergies    amoxicillin (Rash)  IV Contrast (Anaphylaxis)    Intolerances      MEDICATIONS  (STANDING):  donepezil 5 milliGRAM(s) Oral at bedtime  heparin  Injectable 5000 Unit(s) SubCutaneous every 12 hours  meclizine 12.5 milliGRAM(s) Oral two times a day    MEDICATIONS  (PRN):  haloperidol    Injectable 0.25 milliGRAM(s) IV Push every 6 hours PRN agitation    Social History: denies tobacco/etoh/ivdu    ROS: ENT, GI, , CV, Pulm, Neuro, Psych, MS, Heme, Endo, Constitutional all negative except as noted in HPI    Vital Signs Last 24 Hrs  T(C): 36.6 (11 Dec 2017 14:53), Max: 36.7 (10 Dec 2017 22:04)  T(F): 97.8 (11 Dec 2017 14:53), Max: 98.1 (10 Dec 2017 22:04)  HR: 84 (11 Dec 2017 14:53) (72 - 87)  BP: 153/77 (11 Dec 2017 14:53) (146/74 - 153/77)  RR: 18 (11 Dec 2017 14:53) (16 - 18)  SpO2: 96% (11 Dec 2017 14:53) (96% - 100%)                          13.0   7.96  )-----------( 262      ( 11 Dec 2017 10:43 )             38.5    12-11    137  |  106  |  15  ----------------------------<  67<L>  4.5   |  14<L>  |  0.85    Ca    9.3      11 Dec 2017 10:43    TPro  7.4  /  Alb  4.2  /  TBili  0.4  /  DBili  x   /  AST  18  /  ALT  14  /  AlkPhos  77  12-10       PHYSICAL EXAM:  Gen: NAD, well-developed  Head: Normocephalic, Atraumatic  Face: no edema/erythema/fluctuance, parotid glands soft without mass  Eyes: PERRL, EOMI, no scleral injection  Ears: Right - ear canal clear, with mild erythema floor of canal likely 2/2 trauma no obvious edema purulence or drainage TM intact without effusion no mastoid tenderness or fluctuance            Left - ear canal clear, TM intact without effusion no mastoid tenderness or fluctuance  Nose: Nares bilaterally patent, no discharge  Mouth: Mucosa moist, tongue/uvula midline, oropharynx clear  Neck: Flat, supple, no lymphadenopathy, trachea midline, no masses  Resp: breathing easily, no stridor  CV: no peripheral edema/cyanosis
Patient is an 85 year old woman admitted with right ear pain with radiation to the right jaw. Patient states she underwent cleaning of the right ear and subsequently has noted severe pain which radiates from behind the right ear to the right jaw. She was later placed on antibiotics for possible ear infection which she reportedly discontinued due to a rash. Presently she states the analgesics have relieved the pain significantly. She denies other pain involving the head. She denies other neurological complaints. She denies fever, trauma.    PM S/P Partial colectomy for neoplasm 2007          HLD          Dementia-evaluated by neurologist in [place] 6 yeas ago and had MRI Head . Daughter states she lives alone and she visits frequently and provides assistance as requested though she lives in [facility] and her mother lives on .           The daughter states that she has had periods of marked depression. She has been noted to be paranoid frequently and mentions the War often. She has been seen by Psychiatry in the past and has been recommended to take neuroleptics in the past.    Allergy to IV contrast dye. Daughter states has not had contrast in relation with MRI rather with CT. She had anaphylactic reaction.                Amoxicillin    Exam: Awake, alert, appropriate            Pupils 2.5mm reactive, EOM intact, no nystagmus,             Neck supple            CN II-XII intact             Motor tone and strength normal                          14.8   8.3   )-----------( 288      ( 10 Dec 2017 16:17 )             43.1   12-10    139  |  104  |  16  ----------------------------<  93  3.9   |  20<L>  |  0.90    Ca    9.6      10 Dec 2017 16:17    TPro  7.4  /  Alb  4.2  /  TBili  0.4  /  DBili  x   /  AST  18  /  ALT  14  /  AlkPhos  77  12-10  < from: CT Head No Cont (12.10.17 @ 16:34) >      There is no evidence of acute intracranial hemorrhage. No mass effect is   evident in the brain. The basal cisterns are patent.The gray-white   differentiation is preserved. No extra axial collection is appreciated.     There is prominence of the ventricles and sulci compatible with   age-appropriate involution and patchy periventricular hypodensity without   mass effect consistent with chronic microvascular ischemic change. These   findings are progressed compared to 2011.    A chronic appearing lacunar infarct involves the right superior   cerebellum, with ex vacuo dilatation of an adjacent sulcus, best   appreciated on the sagittal series. The lacunar infarct is new compared   with the 2011 head CT.    There is no depressed calvarial fracture. The visualized mastoid air   cells and paranasal sinuses are clear. The patient is status post   bilateral cataract surgery.    IMPRESSION:    No evidence for calvarial fracture, acute intracranial hemorrhage, or   acute infarct. If the patient has new and persistent symptoms, consider   short interval follow-up head CT or brain MRI follow-up.

## 2017-12-11 NOTE — PROGRESS NOTE ADULT - PROBLEM SELECTOR PLAN 2
likely worsening dementia  -psych consult done  -started on haldol 0.5 mg in am and 1 mg at bed time

## 2017-12-11 NOTE — CONSULT NOTE ADULT - ASSESSMENT
86 year old female presents to the ED with R ear/head/jaw pain for 3-4 weeks. 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 Patient states she goes to an urgent care regularly to get her ear wax cleared. 3-4 weeks ago, the urgent care “cleaning the barbara out w/ water and then a sharp instrument” and since then she has been having constant ear pain which is constant, radiates from the R ear to her head (R temporal region) and jaw. She was given a prescription of amoxicillin and ear drops however patient developed a rash and stopped taking the medication. Patient denies numbness, weakness, headache, vomiting, dizziness, or nausea, but reports feeling repulsed by food and weight loss of 7lbs in 1 week.    On neuro exam, patient had difficulty remembering 3 words after 5 minutes, conductive hearing loss on right, and ear canals were erythematous, otherwise, nonfocal exam.    Patient with right sided head pain likely secondary to otitis externa    Recommend:  CT head to rule out acute intracranial pathology  ENT consult  Psych consult - As per daughter, has possible underlying psychiatric disorder although never diagnosed  History of benign colon neoplasm (s/p resection 2007, no chemo/radiation) and melanoma (though as per daughter was never diagnosed/biopsied), would recommend MRI with IV contrast to rule out possible metastatic lesions however patient is allergic to IV contrast  MRI brain can be done without contrast although results would be limited
85 year old woman adimitted with pain radiating from behind right ear to right jaw following having a cleaning of the right ear. She was subsequently placed on antibiotics for an ear infection which were discontinued after 2 days following a rash. She has noted relief of the pain with analgesics. Neurological exam as above .    1) Agree with MRI Head without contrast.  Will discuss consideration of MRI Head with contrast as no allergy to gadalinium. in evaluation of possible abscess.  2) Await ENT Consult
86yoF with R Otalgia-currently resolved, with no obvious otologic source, possible neuralgia No acute ENT intervention at this time

## 2017-12-11 NOTE — PROGRESS NOTE ADULT - ATTENDING COMMENTS
d/w family in detail regarding plan of care, as per daughter pt. is not safe alone at home, will d/w social w/u , may need some home care on d/c or may need placement

## 2017-12-11 NOTE — CONSULT NOTE ADULT - ATTENDING COMMENTS
Ear exam is completely normal with no evidence of any recent trauma to ear canal or inflammation/infection. Reassured patient and family of this and consider muscular or neuropathic pain. Agree that conservative measures with warm compresses/acupuncture are reasonable and if symptoms persist she should f/u with her ENT/neurology as an outpatient. No acute inpatient intervention at this time. Ear exam is completely normal with no evidence of any recent trauma to ear canal or inflammation/infection. Reviewed CT head and no mastoid or middle ear effusion to suggest ear infection/abscess. There is no ENT indication for further imaging with MRI in light of normal exam and CT. Patient reports pain completely resolved with medication she had earlier, however this was meclizine, she was not given any pain reliever so pain appears to be episodic. Patient appears somewhat paranoid and fixated on previous cerumen removal which she is convinced somehow damaged her ear despite completely normal appearing canal without any evidence of trauma.    Reassured patient and family of this and consider muscular or neuropathic pain. Agree that conservative measures with warm compresses/acupuncture are reasonable and if symptoms persist she should f/u with her ENT/neurology as an outpatient. No acute inpatient intervention at this time. Ear exam is completely normal with no evidence of any recent trauma to ear canal or inflammation/infection. Reviewed CT head and no mastoid or middle ear effusion to suggest ear infection/abscess. There is no ENT indication for further imaging with MRI in light of normal exam and CT. Patient reports pain completely resolved with medication she had earlier, however this was meclizine, she was not given any pain reliever so unsure why improved. Patient appears somewhat paranoid and fixated on previous cerumen removal which she is convinced somehow damaged her ear despite completely normal appearing canal without any evidence of trauma.    Reassured patient and family of this and consider muscular, neuropathic pain, or referred pain. Agree that conservative measures with warm compresses/acupuncture are reasonable and if symptoms persist she should f/u with her ENT/neurology as an outpatient. Daughter notes that had hx of impacted wisdom tooth in past; explained that doubt this would be causing an issue in this otherwise edentulous patient however reasonable to pursue outpatient dental eval since tooth pain can be referred to the ear and daughter notes she was complaining of gum pain yesterday. No acute inpatient intervention at this time. Discussed this with neurology resident. Ear exam is completely normal with no evidence of any recent trauma to ear canal or inflammation/infection. Reviewed CT head and no mastoid or middle ear effusion to suggest ear infection/abscess. There is no ENT indication for further imaging with MRI in light of normal exam and CT, and normal cbc without leukocytosis or left shift. Patient reports pain completely resolved with medication she had earlier, however this was meclizine, she was not given any pain reliever so unsure why improved. Patient appears somewhat paranoid and fixated on previous cerumen removal which she is convinced somehow damaged her ear despite completely normal appearing canal without any evidence of trauma.    Reassured patient and family of this and consider muscular, neuropathic pain, or referred pain. Agree that conservative measures with warm compresses/acupuncture are reasonable and if symptoms persist she should f/u with her ENT/neurology as an outpatient. Daughter notes that had hx of impacted wisdom tooth in past; explained that doubt this would be causing an issue in this otherwise edentulous patient however reasonable to pursue outpatient dental eval since tooth pain can be referred to the ear and daughter notes she was complaining of gum pain yesterday. No acute inpatient intervention at this time, however if pain recurs would f/u as outpt. Discussed this with neurology resident.

## 2017-12-12 PROCEDURE — 99232 SBSQ HOSP IP/OBS MODERATE 35: CPT

## 2017-12-12 PROCEDURE — 70553 MRI BRAIN STEM W/O & W/DYE: CPT | Mod: 26

## 2017-12-12 RX ORDER — ACETAMINOPHEN 500 MG
650 TABLET ORAL ONCE
Qty: 0 | Refills: 0 | Status: COMPLETED | OUTPATIENT
Start: 2017-12-12 | End: 2017-12-12

## 2017-12-12 RX ORDER — ACETAMINOPHEN 500 MG
650 TABLET ORAL ONCE
Qty: 0 | Refills: 0 | Status: DISCONTINUED | OUTPATIENT
Start: 2017-12-12 | End: 2017-12-18

## 2017-12-12 RX ADMIN — Medication 650 MILLIGRAM(S): at 21:50

## 2017-12-12 RX ADMIN — Medication 650 MILLIGRAM(S): at 21:21

## 2017-12-12 RX ADMIN — DONEPEZIL HYDROCHLORIDE 5 MILLIGRAM(S): 10 TABLET, FILM COATED ORAL at 21:22

## 2017-12-12 RX ADMIN — HALOPERIDOL DECANOATE 0.5 MILLIGRAM(S): 100 INJECTION INTRAMUSCULAR at 12:39

## 2017-12-12 RX ADMIN — HALOPERIDOL DECANOATE 1 MILLIGRAM(S): 100 INJECTION INTRAMUSCULAR at 21:21

## 2017-12-12 NOTE — PROGRESS NOTE BEHAVIORAL HEALTH - NSBHCHARTREVIEWLAB_PSY_A_CORE FT
13.0   7.96  )-----------( 262      ( 11 Dec 2017 10:43 )             38.5     12-11    137  |  106  |  15  ----------------------------<  67<L>  4.5   |  14<L>  |  0.85    Ca    9.3      11 Dec 2017 10:43    TPro  7.4  /  Alb  4.2  /  TBili  0.4  /  DBili  x   /  AST  18  /  ALT  14  /  AlkPhos  77  12-10
13.0   7.96  )-----------( 262      ( 11 Dec 2017 10:43 )             38.5     12-11    137  |  106  |  15  ----------------------------<  67<L>  4.5   |  14<L>  |  0.85    Ca    9.3      11 Dec 2017 10:43    TPro  7.4  /  Alb  4.2  /  TBili  0.4  /  DBili  x   /  AST  18  /  ALT  14  /  AlkPhos  77  12-10

## 2017-12-12 NOTE — PROGRESS NOTE ADULT - PROBLEM SELECTOR PLAN 2
likely worsening dementia  -psych consult done  -started on haldol 0.5 mg in am and 1 mg at bed time: much better

## 2017-12-12 NOTE — PHYSICAL THERAPY INITIAL EVALUATION ADULT - ADDITIONAL COMMENTS
HPI cont: She was given a prescription of amoxicillin and ear drops however patient developed a rash and stopped taking the medication.    Patient was living alone and was independent in ADLs.

## 2017-12-12 NOTE — PHYSICAL THERAPY INITIAL EVALUATION ADULT - PERTINENT HX OF CURRENT PROBLEM, REHAB EVAL
86 year old female presents to the ED with R ear/head/jaw pain for 3-4 weeks. Patient states she goes to an urgent care regularly to get her ear wax cleared. 3-4 weeks ago, the urgent care “cleaning the barbara out w/ water and then a sharp instrument” and since then she has been having constant ear pain which is constant, radiates from the R ear to her head (R temporal region) and jaw.

## 2017-12-12 NOTE — PHYSICAL THERAPY INITIAL EVALUATION ADULT - GENERAL OBSERVATIONS, REHAB EVAL
Pt received in bed in [facility], agreeable to PT initial evaluation.  Family present.

## 2017-12-13 PROCEDURE — 72156 MRI NECK SPINE W/O & W/DYE: CPT | Mod: 26

## 2017-12-13 PROCEDURE — 99232 SBSQ HOSP IP/OBS MODERATE 35: CPT

## 2017-12-13 RX ADMIN — Medication 12.5 MILLIGRAM(S): at 18:47

## 2017-12-13 RX ADMIN — HALOPERIDOL DECANOATE 0.5 MILLIGRAM(S): 100 INJECTION INTRAMUSCULAR at 13:11

## 2017-12-13 RX ADMIN — DONEPEZIL HYDROCHLORIDE 5 MILLIGRAM(S): 10 TABLET, FILM COATED ORAL at 22:32

## 2017-12-13 RX ADMIN — HALOPERIDOL DECANOATE 1 MILLIGRAM(S): 100 INJECTION INTRAMUSCULAR at 22:32

## 2017-12-13 NOTE — PROGRESS NOTE BEHAVIORAL HEALTH - NSBHCONSULTMEDS_PSY_A_CORE FT
Haldol 0.5mg AM and 1mg p.o. at bedtime  Melatonin 3mg p.o. at bedtime

## 2017-12-13 NOTE — PROGRESS NOTE BEHAVIORAL HEALTH - NSBHCONSULTRECOMMENDOTHER_PSY_A_CORE FT
Patient should be considered for home health aide after discharge.

## 2017-12-13 NOTE — PROGRESS NOTE BEHAVIORAL HEALTH - SUMMARY
This is an 86-y.o. CF pt. with no formal PPHx, PMH significant for colon ca and DLD, no active substance abuse p/w sharp ear pain after cerumen debridement in urgent care, daughter requesting psych consult for 10 years of ongoing paranoia and now with increasing hyperacusis, convinced her upstairs neighbor is producing noises to upset her. On initial evaluation, patient delusionally convited that neighbor is intentionally 'messing with her'.     Daughter states pt has always had a paranoid nature, felt people did things purposely against her.  She states in the past 10 years or so pt has become increasingly paranoid, delusional, now hallucinating.    Also feels worried that the pt has some cognitive decline, is not as able to tend to ADLs and personal finances as she claims. Patient dismissive about her healthcare, at present time lacks capacity to refuse MRI and critical care. With medications, her overall condition is improving and patient is more appropriate and coherent.
This is an 86-y.o. CF pt. with no formal PPHx, PMH significant for colon ca and DLD, no active substance abuse p/w sharp ear pain after cerumen debridement in urgent care, daughter requesting psych consult for 10 years of ongoing paranoia and now with increasing hyperacusis, convinced her upstairs neighbor is producing noises to upset her. On initial evaluation, patient delusionally convited that neighbor is intentionally 'messing with her'.     Daughter states pt has always had a paranoid nature, felt people did things purposely against her.  She states in the past 10 years or so pt has become increasingly paranoid, delusional, now hallucinating.    Also feels worried that the pt has some cognitive decline, is not as able to tend to ADLs and personal finances as she claims. Patient dismissive about her healthcare, at present time lacks capacity to refuse MRI and critical care.
This is an 86-y.o. CF pt. with no formal PPHx, PMH significant for colon ca and DLD, no active substance abuse p/w sharp ear pain after cerumen debridement in urgent care, daughter requesting psych consult for 10 years of ongoing paranoia and now with increasing hyperacusis, convinced her upstairs neighbor is producing noises to upset her. On initial evaluation, patient delusionally convited that neighbor is intentionally 'messing with her'.     Daughter states pt has always had a paranoid nature, felt people did things purposely against her.  She states in the past 10 years or so pt has become increasingly paranoid, delusional, now hallucinating.    Also feels worried that the pt has some cognitive decline, is not as able to tend to ADLs and personal finances as she claims. Patient dismissive about her healthcare, at present time lacks capacity to refuse MRI and critical care.

## 2017-12-13 NOTE — PROGRESS NOTE BEHAVIORAL HEALTH - NSBHCONSULTMEDSEVERE_PSY_A_CORE FT
Haldol 1-3mg i.v. Q6H PRN (monitor QTc on EKG)

## 2017-12-13 NOTE — PROGRESS NOTE ADULT - ASSESSMENT
1) MRI Cervical Spine without and with contrast to evaluate metastatic disease.     Spoke with patient, patient' daughter, and patient's granddaughter.

## 2017-12-13 NOTE — PROGRESS NOTE BEHAVIORAL HEALTH - NSBHFUPINTERVALHXFT_PSY_A_CORE
Patient seen and evaluated, staff consulted, chart, labs, meds reviewed. Reports no issues overnight. Patient continues to complain of ear pain, still refusing the MRI. While seemingly oriented and appropriate, patient refuses to engage in a discussion about her health and proposed course of tx, is very dismissive of pending tests, and fails to ponder risks and benefits of accepting tx. vs. refusing workup.    Direction of care discussed with the patient and the family.  Counseling and support provided.  Patient did not represent a management problem overnight.
Patient seen and evaluated, staff consulted, chart, labs, meds reviewed. Reports no issues overnight. Patient tolerated the MRI fine, continues to complain of residual ear pain. She is oriented and appropriate, maintains her superficial engagement re. healthcare and significant issues. No SI/Hi, no delusions or hallucinations, no prominent mood sx.    Direction of care discussed with the patient and the family.  Counseling and support provided.  Patient did not represent a management problem overnight.
Patient seen and evaluated, staff consulted, chart, labs, meds reviewed. Reports no issues overnight. Patient continues to complain of ear pain, but assents to the MRI. Still  oriented and appropriate, patient maintains her superficial engagement re. healthcare and significant issues. No SI/Hi, no delusions or hallucinations, no prominent mood sx.    Direction of care discussed with the patient and the family.  Counseling and support provided.  Patient did not represent a management problem overnight.

## 2017-12-13 NOTE — PROGRESS NOTE BEHAVIORAL HEALTH - NSBHCHARTREVIEWIMAGING_PSY_A_CORE FT
< from: MR Head w/wo IV Cont (12.12.17 @ 20:44) >    Impression: Age-appropriate lesional and ischemic gliotic changes. No   acute infarcts, hemorrhage or mass. No evidence of brain metastases. No   abnormal enhancement.      < end of copied text >

## 2017-12-13 NOTE — PROGRESS NOTE BEHAVIORAL HEALTH - AXIS III
SP resection of colon ca/polyp abd 78,No chemo/rt, HLD Not treated. renal colic,

## 2017-12-13 NOTE — PROGRESS NOTE BEHAVIORAL HEALTH - PRIMARY DX
Dementia associated with other underlying disease with behavioral disturbance

## 2017-12-13 NOTE — PROGRESS NOTE BEHAVIORAL HEALTH - NSBHCONSFOLLOWNEEDS_PSY_A_CORE
no psychiatric contraindications to discharge
Patient needs further psychiatric safety assessment prior to discharge
Patient needs further psychiatric safety assessment prior to discharge

## 2017-12-13 NOTE — PROGRESS NOTE BEHAVIORAL HEALTH - NSBHCHARTREVIEWVS_PSY_A_CORE FT
Vital Signs Last 24 Hrs  T(C): 36.6 (11 Dec 2017 14:53), Max: 36.7 (10 Dec 2017 22:04)  T(F): 97.8 (11 Dec 2017 14:53), Max: 98.1 (10 Dec 2017 22:04)  HR: 84 (11 Dec 2017 14:53) (72 - 87)  BP: 153/77 (11 Dec 2017 14:53) (146/74 - 153/77)  BP(mean): --  RR: 18 (11 Dec 2017 14:53) (16 - 18)  SpO2: 96% (11 Dec 2017 14:53) (96% - 100%)
Vital Signs Last 24 Hrs  T(C): 36.7 (13 Dec 2017 08:41), Max: 37.4 (12 Dec 2017 22:02)  T(F): 98 (13 Dec 2017 08:41), Max: 99.4 (12 Dec 2017 22:02)  HR: 76 (13 Dec 2017 08:41) (76 - 93)  BP: 152/801 (13 Dec 2017 08:41) (121/75 - 152/801)  BP(mean): --  RR: 18 (13 Dec 2017 08:41) (18 - 18)  SpO2: 96% (13 Dec 2017 08:41) (95% - 96%)
Vital Signs Last 24 Hrs  T(C): 36.8 (12 Dec 2017 15:26), Max: 37.3 (12 Dec 2017 09:05)  T(F): 98.3 (12 Dec 2017 15:26), Max: 99.2 (12 Dec 2017 09:05)  HR: 93 (12 Dec 2017 15:26) (82 - 94)  BP: 139/76 (12 Dec 2017 15:26) (137/73 - 171/75)  BP(mean): --  RR: 18 (12 Dec 2017 15:26) (18 - 18)  SpO2: 96% (12 Dec 2017 15:26) (94% - 96%)

## 2017-12-14 LAB
ANION GAP SERPL CALC-SCNC: 15 MMOL/L — SIGNIFICANT CHANGE UP (ref 5–17)
BUN SERPL-MCNC: 12 MG/DL — SIGNIFICANT CHANGE UP (ref 7–23)
CALCIUM SERPL-MCNC: 9.8 MG/DL — SIGNIFICANT CHANGE UP (ref 8.4–10.5)
CHLORIDE SERPL-SCNC: 100 MMOL/L — SIGNIFICANT CHANGE UP (ref 96–108)
CO2 SERPL-SCNC: 20 MMOL/L — LOW (ref 22–31)
CREAT SERPL-MCNC: 0.78 MG/DL — SIGNIFICANT CHANGE UP (ref 0.5–1.3)
GLUCOSE SERPL-MCNC: 98 MG/DL — SIGNIFICANT CHANGE UP (ref 70–99)
HCT VFR BLD CALC: 43.1 % — SIGNIFICANT CHANGE UP (ref 34.5–45)
HGB BLD-MCNC: 14.3 G/DL — SIGNIFICANT CHANGE UP (ref 11.5–15.5)
MCHC RBC-ENTMCNC: 29.5 PG — SIGNIFICANT CHANGE UP (ref 27–34)
MCHC RBC-ENTMCNC: 33.2 GM/DL — SIGNIFICANT CHANGE UP (ref 32–36)
MCV RBC AUTO: 88.9 FL — SIGNIFICANT CHANGE UP (ref 80–100)
PLATELET # BLD AUTO: 252 K/UL — SIGNIFICANT CHANGE UP (ref 150–400)
POTASSIUM SERPL-MCNC: 4.1 MMOL/L — SIGNIFICANT CHANGE UP (ref 3.5–5.3)
POTASSIUM SERPL-SCNC: 4.1 MMOL/L — SIGNIFICANT CHANGE UP (ref 3.5–5.3)
RBC # BLD: 4.85 M/UL — SIGNIFICANT CHANGE UP (ref 3.8–5.2)
RBC # FLD: 13.5 % — SIGNIFICANT CHANGE UP (ref 10.3–14.5)
SODIUM SERPL-SCNC: 135 MMOL/L — SIGNIFICANT CHANGE UP (ref 135–145)
WBC # BLD: 10.9 K/UL — HIGH (ref 3.8–10.5)
WBC # FLD AUTO: 10.9 K/UL — HIGH (ref 3.8–10.5)

## 2017-12-14 RX ADMIN — Medication 12.5 MILLIGRAM(S): at 05:13

## 2017-12-14 RX ADMIN — DONEPEZIL HYDROCHLORIDE 5 MILLIGRAM(S): 10 TABLET, FILM COATED ORAL at 21:28

## 2017-12-14 RX ADMIN — HALOPERIDOL DECANOATE 1 MILLIGRAM(S): 100 INJECTION INTRAMUSCULAR at 21:28

## 2017-12-14 RX ADMIN — Medication 12.5 MILLIGRAM(S): at 17:27

## 2017-12-14 RX ADMIN — HALOPERIDOL DECANOATE 0.5 MILLIGRAM(S): 100 INJECTION INTRAMUSCULAR at 11:34

## 2017-12-15 LAB
HCT VFR BLD CALC: 42.7 % — SIGNIFICANT CHANGE UP (ref 34.5–45)
HGB BLD-MCNC: 14.5 G/DL — SIGNIFICANT CHANGE UP (ref 11.5–15.5)
MCHC RBC-ENTMCNC: 29.7 PG — SIGNIFICANT CHANGE UP (ref 27–34)
MCHC RBC-ENTMCNC: 34 GM/DL — SIGNIFICANT CHANGE UP (ref 32–36)
MCV RBC AUTO: 87.3 FL — SIGNIFICANT CHANGE UP (ref 80–100)
PLATELET # BLD AUTO: 238 K/UL — SIGNIFICANT CHANGE UP (ref 150–400)
RBC # BLD: 4.89 M/UL — SIGNIFICANT CHANGE UP (ref 3.8–5.2)
RBC # FLD: 13.5 % — SIGNIFICANT CHANGE UP (ref 10.3–14.5)
WBC # BLD: 10.74 K/UL — HIGH (ref 3.8–10.5)
WBC # FLD AUTO: 10.74 K/UL — HIGH (ref 3.8–10.5)

## 2017-12-15 RX ORDER — ACETAMINOPHEN 500 MG
650 TABLET ORAL
Qty: 0 | Refills: 0 | Status: DISCONTINUED | OUTPATIENT
Start: 2017-12-15 | End: 2017-12-18

## 2017-12-15 RX ORDER — LANOLIN ALCOHOL/MO/W.PET/CERES
3 CREAM (GRAM) TOPICAL AT BEDTIME
Qty: 0 | Refills: 0 | Status: DISCONTINUED | OUTPATIENT
Start: 2017-12-15 | End: 2017-12-15

## 2017-12-15 RX ORDER — HALOPERIDOL DECANOATE 100 MG/ML
1 INJECTION INTRAMUSCULAR EVERY 6 HOURS
Qty: 0 | Refills: 0 | Status: DISCONTINUED | OUTPATIENT
Start: 2017-12-15 | End: 2017-12-18

## 2017-12-15 RX ORDER — HALOPERIDOL DECANOATE 100 MG/ML
0.5 INJECTION INTRAMUSCULAR AT BEDTIME
Qty: 0 | Refills: 0 | Status: DISCONTINUED | OUTPATIENT
Start: 2017-12-15 | End: 2017-12-18

## 2017-12-15 RX ADMIN — HALOPERIDOL DECANOATE 0.5 MILLIGRAM(S): 100 INJECTION INTRAMUSCULAR at 09:14

## 2017-12-15 RX ADMIN — Medication 12.5 MILLIGRAM(S): at 05:44

## 2017-12-15 RX ADMIN — DONEPEZIL HYDROCHLORIDE 5 MILLIGRAM(S): 10 TABLET, FILM COATED ORAL at 21:23

## 2017-12-15 RX ADMIN — HALOPERIDOL DECANOATE 0.5 MILLIGRAM(S): 100 INJECTION INTRAMUSCULAR at 21:23

## 2017-12-15 NOTE — CHART NOTE - NSCHARTNOTEFT_GEN_A_CORE
Called by pt daughter concerning her mother's behavior. Daughter verbalized that she was lucid today. D/w Dr. Carrero (psych) of daughter concerns. He recommended that we discontinue haldol and start melatonin 3 mg @hs. In addition, d/w Dr. Raphael (neuro) also; will d/c meclizine. Discuss above with Dr. Pereira, agrees with discontinuing meclizine, but would prefer we continue with haldol 0.5 @hs and IV prn, and order Tylenol for discomfort. Pt awaiting for THOR Hammer NP

## 2017-12-15 NOTE — PROGRESS NOTE ADULT - ATTENDING COMMENTS
pt. will be continued on haldol 0.5 mg q hs and also tylenol 650 mg bid . d/w family/ daughter in detail about continuing haldol small dose . pt. is stable to be d/c to rehab

## 2017-12-15 NOTE — PROGRESS NOTE ADULT - ASSESSMENT
1) Patient for discharge to rehab today.     Spoke with patient, patient' daughter, and patient's son..

## 2017-12-16 LAB
HCT VFR BLD CALC: 41.6 % — SIGNIFICANT CHANGE UP (ref 34.5–45)
HGB BLD-MCNC: 14 G/DL — SIGNIFICANT CHANGE UP (ref 11.5–15.5)
MCHC RBC-ENTMCNC: 29.5 PG — SIGNIFICANT CHANGE UP (ref 27–34)
MCHC RBC-ENTMCNC: 33.7 GM/DL — SIGNIFICANT CHANGE UP (ref 32–36)
MCV RBC AUTO: 87.6 FL — SIGNIFICANT CHANGE UP (ref 80–100)
PLATELET # BLD AUTO: 239 K/UL — SIGNIFICANT CHANGE UP (ref 150–400)
RBC # BLD: 4.75 M/UL — SIGNIFICANT CHANGE UP (ref 3.8–5.2)
RBC # FLD: 13.5 % — SIGNIFICANT CHANGE UP (ref 10.3–14.5)
WBC # BLD: 11.17 K/UL — HIGH (ref 3.8–10.5)
WBC # FLD AUTO: 11.17 K/UL — HIGH (ref 3.8–10.5)

## 2017-12-16 RX ADMIN — Medication 650 MILLIGRAM(S): at 05:42

## 2017-12-16 NOTE — ED ADULT NURSE NOTE - CAS DISCH BELONGINGS RETURNED
Preventing Sinusitis    Colds, flu, and allergies make it more likely for you to get sinusitis. Do your best to prevent sinusitis by preventing these problems. Do what you can to avoid getting colds and other infections.  Stay away from things that cause · Stay away from all types of smoke, which dries out sinus linings. This includes tobacco smoke and chemical smoke in workplace settings. · Use saltwater rinses. Date Last Reviewed: 10/1/2016  © 9249-7787 [employer].  [facility] Your doctor may prescribe medications to help treat your sinusitis. If you have an infection, antibiotics can help clear it up. If you are prescribed antibiotics, take all pills on schedule until they are gone, even if you feel better.  Decongestants help r · Over-the-counter decongestants may be used unless a similar medicine was prescribed. Nasal sprays work the fastest. Use one that contains phenylephrine or oxymetazoline. First blow the nose gently. Then use the spray.  Do not use these medicines more ofte © 2779-0749 [employer] [address]. [address] [facility], [facility] [place]. All rights reserved. This information is not intended as a substitute for professional medical care. Always follow your healthcare professional's instructions. Not applicable

## 2017-12-17 RX ADMIN — Medication 650 MILLIGRAM(S): at 05:11

## 2017-12-17 RX ADMIN — HALOPERIDOL DECANOATE 0.5 MILLIGRAM(S): 100 INJECTION INTRAMUSCULAR at 08:01

## 2017-12-17 RX ADMIN — DONEPEZIL HYDROCHLORIDE 5 MILLIGRAM(S): 10 TABLET, FILM COATED ORAL at 21:05

## 2017-12-17 RX ADMIN — Medication 650 MILLIGRAM(S): at 18:38

## 2017-12-17 RX ADMIN — HALOPERIDOL DECANOATE 0.5 MILLIGRAM(S): 100 INJECTION INTRAMUSCULAR at 21:05

## 2017-12-18 ENCOUNTER — TRANSCRIPTION ENCOUNTER (OUTPATIENT)
Age: 82
End: 2017-12-18

## 2017-12-18 VITALS
TEMPERATURE: 98 F | RESPIRATION RATE: 18 BRPM | HEART RATE: 77 BPM | DIASTOLIC BLOOD PRESSURE: 77 MMHG | SYSTOLIC BLOOD PRESSURE: 128 MMHG | OXYGEN SATURATION: 97 %

## 2017-12-18 LAB — PARANEOPLASTIC AB PNL SER: SIGNIFICANT CHANGE UP

## 2017-12-18 PROCEDURE — 85027 COMPLETE CBC AUTOMATED: CPT

## 2017-12-18 PROCEDURE — 97116 GAIT TRAINING THERAPY: CPT

## 2017-12-18 PROCEDURE — 72156 MRI NECK SPINE W/O & W/DYE: CPT

## 2017-12-18 PROCEDURE — 70553 MRI BRAIN STEM W/O & W/DYE: CPT

## 2017-12-18 PROCEDURE — 97530 THERAPEUTIC ACTIVITIES: CPT

## 2017-12-18 PROCEDURE — 86140 C-REACTIVE PROTEIN: CPT

## 2017-12-18 PROCEDURE — 86255 FLUORESCENT ANTIBODY SCREEN: CPT

## 2017-12-18 PROCEDURE — 80048 BASIC METABOLIC PNL TOTAL CA: CPT

## 2017-12-18 PROCEDURE — A9585: CPT

## 2017-12-18 PROCEDURE — 82962 GLUCOSE BLOOD TEST: CPT

## 2017-12-18 PROCEDURE — 93005 ELECTROCARDIOGRAM TRACING: CPT

## 2017-12-18 PROCEDURE — 82746 ASSAY OF FOLIC ACID SERUM: CPT

## 2017-12-18 PROCEDURE — 80053 COMPREHEN METABOLIC PANEL: CPT

## 2017-12-18 PROCEDURE — 82607 VITAMIN B-12: CPT

## 2017-12-18 PROCEDURE — 86780 TREPONEMA PALLIDUM: CPT

## 2017-12-18 PROCEDURE — 99285 EMERGENCY DEPT VISIT HI MDM: CPT | Mod: 25

## 2017-12-18 PROCEDURE — 84443 ASSAY THYROID STIM HORMONE: CPT

## 2017-12-18 PROCEDURE — 71045 X-RAY EXAM CHEST 1 VIEW: CPT

## 2017-12-18 PROCEDURE — 97161 PT EVAL LOW COMPLEX 20 MIN: CPT

## 2017-12-18 PROCEDURE — 85652 RBC SED RATE AUTOMATED: CPT

## 2017-12-18 PROCEDURE — 70450 CT HEAD/BRAIN W/O DYE: CPT

## 2017-12-18 RX ORDER — HALOPERIDOL DECANOATE 100 MG/ML
1 INJECTION INTRAMUSCULAR
Qty: 0 | Refills: 0 | COMMUNITY
Start: 2017-12-18

## 2017-12-18 RX ORDER — DONEPEZIL HYDROCHLORIDE 10 MG/1
1 TABLET, FILM COATED ORAL
Qty: 0 | Refills: 0 | COMMUNITY
Start: 2017-12-18

## 2017-12-18 RX ORDER — ACETAMINOPHEN 500 MG
2 TABLET ORAL
Qty: 0 | Refills: 0 | COMMUNITY
Start: 2017-12-18

## 2017-12-18 RX ADMIN — Medication 650 MILLIGRAM(S): at 05:34

## 2017-12-18 NOTE — DISCHARGE NOTE ADULT - PATIENT PORTAL LINK FT
“You can access the FollowHealth Patient Portal, offered by [facility], by registering with the following website: [email or web address]”

## 2017-12-18 NOTE — DISCHARGE NOTE ADULT - MEDICATION SUMMARY - MEDICATIONS TO TAKE
I will START or STAY ON the medications listed below when I get home from the hospital:    acetaminophen 325 mg oral tablet  -- 2 tab(s) by mouth 2 times a day  -- Indication: For Pain    haloperidol 0.5 mg oral tablet  -- 1 tab(s) by mouth once a day (at bedtime)  -- Indication: For Dementia associated with other underlying disease with behavioral disturbance    donepezil 5 mg oral tablet  -- 1 tab(s) by mouth once a day (at bedtime)  -- Indication: For Dementia associated with other underlying disease with behavioral disturbance    Vitamin D3 1000 intl units oral tablet  -- 1 tab(s) by mouth once a day  -- Indication: For Supplement    Vitamin B-12  -- 1 tab(s) by mouth once a day  -- Indication: For Supplement

## 2017-12-18 NOTE — DISCHARGE NOTE ADULT - HOSPITAL COURSE
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   ENT consulted -Ear exam is completely normal with no evidence of any recent trauma to ear canal or inflammation/infection. Reviewed CT head and no mastoid or middle ear effusion to suggest ear infection/abscess. There is no ENT indication for further imaging with MRI in light of normal exam and CT, and normal cbc without leukocytosis or left shift. Patient reports pain completely resolved with medication she had earlier, however this was meclizine, she was not given any pain reliever so unsure why improved  MRI Cspine shows no evidence of metastatic disease. There are no areas of abnormal enhancement. There is evidence of degenerative changes at multiple level  Stable for discharged, follow up with outpatient psych and neurology.

## 2017-12-18 NOTE — DISCHARGE NOTE ADULT - CARE PLAN
Principal Discharge DX:	Intractable episodic headache, unspecified headache type  Goal:	resolved  Instructions for follow-up, activity and diet:	Follow up with neurology  Secondary Diagnosis:	Hallucinations  Goal:	resolved  Secondary Diagnosis:	Dementia associated with other underlying disease with behavioral disturbance

## 2018-06-25 NOTE — ED PROVIDER NOTE - NS ED MD TWO NIGHTS YN
[facility] [address] [place] [address] 
[phone] Patient: Jolynn Canada MRN: [id number] JANINE:[birth year] Visit Information Date & Time Provider Department Dept. Phone Encounter #  
 6/25/2018  1:45 PM Brook Mcmahon MD [facility] [phone] [id number] Follow-up Instructions Return if symptoms worsen or fail to improve. Your Appointments 7/5/2018  2:00 PM  
Follow Up with Ricco Matt MD  
[address] [facility] ([facility]) Appt Note: Return in about 3 months (around 7/19/2018), or if symptoms worsen or fail to improve, for anxiety.; r/s from 07/19/18 due to provider out of the office [address], [place] [address] [address] [id number] [phone]  
  
   
 [address], [address] [place] [address]. [facility] [address] [address] Upcoming Health Maintenance Date Due  
 FOOT EXAM Q1 1/3/1957 EYE EXAM RETINAL OR DILATED Q1 1/3/1957 DTaP/Tdap/Td series (1 - Tdap) 1/3/1968 FOBT Q 1 YEAR AGE 50-75 1/3/1997 ZOSTER VACCINE AGE 60> 11/3/2006 GLAUCOMA SCREENING Q2Y 1/3/2012 Pneumococcal 65+ Low/Medium Risk (1 of 2 - PCV13) 1/3/2012 HEMOGLOBIN A1C Q6M 7/11/2018 Influenza Age 5 to Adult 8/1/2018 MICROALBUMIN Q1 10/11/2018 LIPID PANEL Q1 10/11/2018 MEDICARE YEARLY EXAM 10/12/2018 Allergies as of 6/25/2018  Review Complete On: 6/25/2018 By: Brook Mcmahon MD  
 No Known Allergies Current Immunizations  Never Reviewed Name Date Influenza High Dose Vaccine PF 10/11/2017 Not reviewed this visit You Were Diagnosed With   
  
 Codes Comments Subacute ethmoidal sinusitis    -  Primary ICD-10-CM: J01.20 ICD-9-CM: 461.2 Skin lesion     ICD-10-CM: L98.9 ICD-9-CM: 709.9 Vitals BP Pulse Temp Resp Height(growth percentile) Weight(growth percentile) 140/86 85 98 °F (36.7 °C) 15 5' 8.5\" (1.74 m) 200 lb (90.7 kg) SpO2 BMI Smoking Status 99% 29.97 kg/m2 Current Every Day Smoker Vitals History BMI and BSA Data Body Mass Index Body Surface Area  
 29.97 kg/m 2 2.09 m 2 Preferred Pharmacy Pharmacy Name Phone [facility] [phone] [phone], [address] AT [address] [phone] Your Updated Medication List  
  
   
This list is accurate as of 6/25/18  2:03 PM.  Always use your most recent med list.  
  
  
  
  
 * ALPRAZolam 0.5 mg tablet Commonly known as:  Fanny Hopes Take 1 Tab by mouth three (3) times daily as needed for Anxiety for up to 30 days. Max Daily Amount: 1.5 mg.  
  
 * ALPRAZolam 0.5 mg tablet Commonly known as:  Fanny Hopes Take 1 Tab by mouth three (3) times daily as needed for Anxiety. Max Daily Amount: 1.5 mg.  
Start taking on:  7/19/2018  
  
 aspirin delayed-release 81 mg tablet Take  by mouth daily. atenolol 100 mg tablet Commonly known as:  TENORMIN  
TAKE 1 TABLET BY MOUTH EVERY DAY. azithromycin 250 mg tablet Commonly known as:  Halle Gores Take 2 tablets today, then take 1 tablet daily  
  
 benazepril 40 mg tablet Commonly known as:  LOTENSIN  
TK 1 T PO QD.  
  
 fenofibrate 160 mg tablet Commonly known as:  LOFIBRA TAKE 1 TABLET BY MOUTH EVERY DAY WITH A MEAL  
  
 glipiZIDE SR 10 mg CR tablet Commonly known as:  GLUCOTROL XL  
TAKE 1 TABLET BY MOUTH EVERY DAY  
  
 * metFORMIN 500 mg tablet Commonly known as:  GLUCOPHAGE  
TAKE 2 TABLETS BY MOUTH TWICE DAILY WITH MEAL  
  
 * metFORMIN 500 mg tablet Commonly known as:  GLUCOPHAGE  
TAKE 2 TABLETS BY MOUTH TWICE DAILY WITH MEALS  
  
 * simvastatin 40 mg tablet Commonly known as:  ZOCOR Take 1 Tab by mouth nightly. * simvastatin 40 mg tablet Commonly known as:  ZOCOR  
TAKE 1 TABLET BY MOUTH EVERY NIGHT  
  
 venlafaxine- mg capsule Commonly known as:  EFFEXOR-XR Take 1 Cap by mouth daily. * Notice: This list has 6 medication(s) that are the same as other medications prescribed for you. Read the directions carefully, and ask your doctor or other care provider to review them with you. Prescriptions Sent to Pharmacy Refills  
 azithromycin (ZITHROMAX) 250 mg tablet 0 Sig: Take 2 tablets today, then take 1 tablet daily Class: Normal  
 Pharmacy: [facility] [address] [place] [address] AT [address] Ph #: [phone] Follow-up Instructions Return if symptoms worsen or fail to improve. Patient Instructions Sinusitis: Care Instructions Your Care Instructions Sinusitis is an infection of the lining of the sinus cavities in your head. Sinusitis often follows a cold. It causes pain and pressure in your head and face. In most cases, sinusitis gets better on its own in 1 to 2 weeks. But some mild symptoms may last for several weeks. Sometimes antibiotics are needed. Follow-up care is a key part of your treatment and safety. Be sure to make and go to all appointments, and call your doctor if you are having problems. It's also a good idea to know your test results and keep a list of the medicines you take. How can you care for yourself at home? · Take an over-the-counter pain medicine, such as acetaminophen (Tylenol), ibuprofen (Advil, Motrin), or naproxen (Aleve). Read and follow all instructions on the label. · If the doctor prescribed antibiotics, take them as directed. Do not stop taking them just because you feel better. You need to take the full course of antibiotics. · Be careful when taking over-the-counter cold or flu medicines and Tylenol at the same time. Many of these medicines have acetaminophen, which is Tylenol. Read the labels to make sure that you are not taking more than the recommended dose. Too much acetaminophen (Tylenol) can be harmful. · Breathe warm, moist air from a steamy shower, a hot bath, or a sink filled with hot water. Avoid cold, dry air. Using a humidifier in your home may help. Follow the directions for cleaning the machine. · Use saline (saltwater) nasal washes to help keep your nasal passages open and wash out mucus and bacteria. You can buy saline nose drops at a grocery store or drugstore. Or you can make your own at home by adding 1 teaspoon of salt and 1 teaspoon of baking soda to 2 cups of distilled water. If you make your own, fill a bulb syringe with the solution, insert the tip into your nostril, and squeeze gently. Christina Courts your nose. · Put a hot, wet towel or a warm gel pack on your face 3 or 4 times a day for 5 to 10 minutes each time. · Try a decongestant nasal spray like oxymetazoline (Afrin). Do not use it for more than 3 days in a row. Using it for more than 3 days can make your congestion worse. When should you call for help? Call your doctor now or seek immediate medical care if: 
? · You have new or worse swelling or redness in your face or around your eyes. ? · You have a new or higher fever. ? Watch closely for changes in your health, and be sure to contact your doctor if: 
? · You have new or worse facial pain. ? · The mucus from your nose becomes thicker (like pus) or has new blood in it. ? · You are not getting better as expected. Where can you learn more? Go to http://dariusz-stanley.info/. Enter P191 in the search box to learn more about \"Sinusitis: Care Instructions. \" Current as of: May 12, 2017 Content Version: 11.4 © 1695-0884 [employer]. Care instructions adapted under license by [employer] (which disclaims liability or warranty for this information).  If you have questions about a medical condition or this instruction, always ask your healthcare professional. [address]  any warranty or liability for your use of this information. Introducing [facility]! Dear Yuliana Aeljandra: Thank you for requesting a [employer] account. Our records indicate that you already have an active [employer] account. You can access your account anytime at [email or web address] Did you know that you can access your hospital and ER discharge instructions at any time in [employer]? You can also review all of your test results from your hospital stay or ER visit. Additional Information If you have questions, please visit the Frequently Asked Questions section of the [employer] website at [email or web address] Remember, [employer] is NOT to be used for urgent needs. For medical emergencies, dial 911. Now available from your iPhone and Android! Please provide this summary of care documentation to your next provider. Your primary care clinician is listed as [id number] [facility]. If you have any questions after today's visit, please call [phone]. Yes

## 2019-06-24 NOTE — ED PROVIDER NOTE - CONSTITUTIONAL MOOD
CC: Memory changes and Tremor.      HPI:    Kaushik Siegel is a 68 y.o. male with pmhx of schizophrenia, thrombocytopenia, hld, htn and hematuria who presents today from his group home with one of his case workers who provides his history. He is here for follow up for Neurological follow up for altered mental status. He was last seen on 12/29/19.     He has followed up with Dr. Simon of hematology for his Thrombocytopenia and elevated MCV.  These were thought to be the result of prior liver injury possibly from medications, as well as from the medications themselves.    He has developed a right hand tremor intermittently on a daily basis over the past few months. He seems to be more interactive with his hallucinations as well. He sees apparitions around the house. He also seems more stiff going up and down stairs.  The tremor comes and goes throughout the day.  He has dropped items from his hand when it comes on. His  accompanies him to today's visit and reports more noticeable short term memory problems. When staff asks him to do a task, he seems to have more difficulty remembering to do it.     His blood pressure medication was changed over the last 6 months. Clonazepam was reduced. Blood pressure medication was changed- he switched from Lovastatin to Atorvastatin.         Review of Systems   Constitutional: Negative for chills and fever.   Eyes: Negative for blurred vision and double vision.   Respiratory: Negative for shortness of breath.    Cardiovascular: Negative for chest pain.   Gastrointestinal: Negative for constipation, diarrhea, nausea and vomiting.        Bowel incontinence.    Genitourinary:        Urinary incontinence.    Psychiatric/Behavioral: Positive for memory loss.       Current Outpatient Prescriptions:   •  tamsulosin (FLOMAX) 0.4 MG capsule, Take 0.4 mg by mouth ONE-HALF HOUR AFTER BREAKFAST., Disp: , Rfl:   •  atorvastatin (LIPITOR) 20 MG Tab, Take 20 mg by mouth every  evening., Disp: , Rfl:   •  fluphenazine (PROLIXIN) 2.5 MG Tab, Take 2.5 mg by mouth every day., Disp: , Rfl:   •  metoprolol SR (TOPROL XL) 25 MG TABLET SR 24 HR, Take 25 mg by mouth every day., Disp: , Rfl:   •  traZODone (DESYREL) 150 MG Tab, Take 150 mg by mouth every evening., Disp: , Rfl:   •  benztropine (COGENTIN) 1 MG TABS, Take 1 mg by mouth 2 times a day., Disp: , Rfl:   •  citalopram (CELEXA) 20 MG TABS, Take 20 mg by mouth every day., Disp: , Rfl:   •  hydrochlorothiazide (HYDRODIURIL) 25 MG TABS, Take 25 mg by mouth every day., Disp: , Rfl:   •  clonazepam (KLONOPIN) 0.5 MG TABS, Take 0.25 mg by mouth every evening., Disp: , Rfl:   •  enalapril (VASOTEC) 20 MG tablet, Take 20 mg by mouth 2 times a day., Disp: , Rfl:   •  ziprasidone (GEODON) 80 MG CAPS, Take 80 mg by mouth 2 Times a Day., Disp: , Rfl:   •  lovastatin (MEVACOR) 20 MG TABS, Take 20 mg by mouth every evening., Disp: , Rfl:   •  melatonin 3 MG TABS, Take 3 mg by mouth every bedtime., Disp: , Rfl:     Physical Examination:  Vitals:    06/24/19 1345   BP: 148/76   Pulse: 78   Temp: 36.9 °C (98.4 °F)   SpO2: 93%     Constitutional: Well-developed, well-nourished, good hygiene. Appears stated age.  Cardiovascular: RRR, with no murmurs, rubs or gallops. No carotid bruits.   Respiratory: Lungs CTA B/L, no W/R/R.   Abdomen: Soft Non-tender to Palpation. Non-distended.  Extremities: No peripheral edema, pedal pulses intact.   Skin: Warm, dry, intact. No rashes observed.  Eyes: Sclera anicteric. No nystagmus observed.   Neurologic:   Mental Status: Awake and alert.    Speech: Speech is fluent with normal prosody.   .           Fund of Knowledge: Appropriate   Cranial Nerves:    CN II: Pupils are equal and react appropriately. No APD noted.    CN III, IV, VI: Good eye closure. Extraocular movements are intact.     CN V: Facial sensation is intact and symmetric.     CN VII: No evidence of facial droop.     CN VIII: Hearing is grossly intact.    CN IX  and X: Palate elevates symmetrically.    CN XI: Shoulder shrug is symmetric.     CN XII: Tongue is midline.   Sensory: Sensation is intact to vibration and temperature.    Coordination: There is no discoordination detected with finger tapping or finger to nose testing.        DTR's: Reflexes are 2+ and symmetrical.   Babinski: Toes are downgoing bilaterally.    Romberg: Deferred.   Movements: Right upper extremity rhythmic tremor which does have a pill rolling quality.   Musculoskeletal:    Strength:   Deltoids      R 5/5 L 5/5  Biceps        R 5/5 L 5/5  Triceps       R 5/5 L 5/5  Wrist Ext    R 5/5 L 5/5  Finger Flex R 5/5 L 5/5  Finger Ext   R 5/5 L 5/5  Hip Flex      R 5/5 L 5/5  Knee Flex   R 5/5 L 5/5  Knee Ext    R 5/5 L 5/5  Ankle DF    R 5/5 L 5/5  Ankle PF    R 5/5 L 5/5   Gait: Short steps. Decreased bilateral arm swing.   Tone: Normal bulk and tone.    Joints: No joint swelling.   Psychiatric: At times responds to internal stimuli, talking to himself.     Tremor  Mr. Siegel is a 68-year-old man with a past medical history of schizophrenia, on lifelong antipsychotic medications, who has developed an intermittent right hand involuntary tremor over the past 6 months which appears somewhat parkinsonian.  During our examination today, there were periods when the tremor was not happening at all, and then it suddenly came on with a somewhat exaggerated amplitude.  I am concerned this might work present some form of parkinsonism.  It is very difficult to discern whether the increased hallucinations he has been experiencing may be part of a early Lewy body dementia.  I recommended follow-up with our cognitive specialist, Dr. Grady in 3 months.  If the tremor worsens, persists, or becomes more continuous and interruptive to his functioning, he may benefit from a trial of Sinemet.     Verito Malagon D.O., M.P.H  MS specialist.   Board Certified Neurologist.  Neurology Clerkship Director, [facility]  [facility].    Neurology,  [facility].   Tel: [phone]  Fax: [phone]    Greater than 50% of this 25-minute face-to-face encounter was spent on disease state counseling on parkinsonism and coordination of care.     appropriate

## 2020-07-24 NOTE — PROGRESS NOTE ADULT - SUBJECTIVE AND OBJECTIVE BOX
Patient is a 86y old  Female who presents with a chief complaint of Ear ache and confusion (10 Dec 2017 22:05)      INTERVAL HPI/OVERNIGHT EVENTS:  T(C): 36.6 (12-17-17 @ 08:29), Max: 36.6 (12-16-17 @ 16:26)  HR: 85 (12-17-17 @ 08:29) (85 - 98)  BP: 116/72 (12-17-17 @ 08:29) (116/72 - 123/76)  RR: 18 (12-17-17 @ 08:29) (18 - 18)  SpO2: 95% (12-17-17 @ 08:29) (95% - 99%)  Wt(kg): --  I&O's Summary    16 Dec 2017 07:01  -  17 Dec 2017 07:00  --------------------------------------------------------  IN: 840 mL / OUT: 0 mL / NET: 840 mL    17 Dec 2017 07:01  -  17 Dec 2017 13:15  --------------------------------------------------------  IN: 300 mL / OUT: 0 mL / NET: 300 mL        PAST MEDICAL & SURGICAL HISTORY:  Hyperlipidemia, unspecified hyperlipidemia type  S/P partial colectomy      SOCIAL HISTORY  Alcohol:  Tobacco:  Illicit substance use:    FAMILY HISTORY:    REVIEW OF SYSTEMS:  CONSTITUTIONAL: No fever, weight loss, or fatigue  EYES: No eye pain, visual disturbances, or discharge  ENMT:  No difficulty hearing, tinnitus, vertigo; No sinus or throat pain  NECK: No pain or stiffness  RESPIRATORY: No cough, wheezing, chills or hemoptysis; No shortness of breath  CARDIOVASCULAR: No chest pain, palpitations, dizziness, or leg swelling  GASTROINTESTINAL: No abdominal or epigastric pain. No nausea, vomiting, or hematemesis; No diarrhea or constipation. No melena or hematochezia.  GENITOURINARY: No dysuria, frequency, hematuria, or incontinence  NEUROLOGICAL: No headaches, memory loss, loss of strength, numbness, or tremors  SKIN: No itching, burning, rashes, or lesions   LYMPH NODES: No enlarged glands  ENDOCRINE: No heat or cold intolerance; No hair loss  MUSCULOSKELETAL: No joint pain or swelling; No muscle, back, or extremity pain  PSYCHIATRIC: No depression, anxiety, mood swings, or difficulty sleeping  HEME/LYMPH: No easy bruising, or bleeding gums  ALLERY AND IMMUNOLOGIC: No hives or eczema    RADIOLOGY & ADDITIONAL TESTS:    Imaging Personally Reviewed:  [ ] YES  [ ] NO    Consultant(s) Notes Reviewed:  [ ] YES  [ ] NO    PHYSICAL EXAM:  GENERAL: NAD, well-groomed, well-developed  HEAD:  Atraumatic, Normocephalic  EYES: EOMI, PERRLA, conjunctiva and sclera clear  ENMT: No tonsillar erythema, exudates, or enlargement; Moist mucous membranes, Good dentition, No lesions  NECK: Supple, No JVD, Normal thyroid  NERVOUS SYSTEM:  Alert & Oriented X3, Good concentration; Motor Strength 5/5 B/L upper and lower extremities; DTRs 2+ intact and symmetric  CHEST/LUNG: Clear to percussion bilaterally; No rales, rhonchi, wheezing, or rubs  HEART: Regular rate and rhythm; No murmurs, rubs, or gallops  ABDOMEN: Soft, Nontender, Nondistended; Bowel sounds present  EXTREMITIES:  2+ Peripheral Pulses, No clubbing, cyanosis, or edema  LYMPH: No lymphadenopathy noted  SKIN: No rashes or lesions    LABS:                        14.0   11.17 )-----------( 239      ( 16 Dec 2017 08:19 )             41.6               CAPILLARY BLOOD GLUCOSE                MEDICATIONS  (STANDING):  acetaminophen   Tablet. 650 milliGRAM(s) Oral once  acetaminophen   Tablet. 650 milliGRAM(s) Oral two times a day  donepezil 5 milliGRAM(s) Oral at bedtime  haloperidol     Tablet 0.5 milliGRAM(s) Oral at bedtime    MEDICATIONS  (PRN):  haloperidol    Injectable 1 milliGRAM(s) IntraMuscular every 6 hours PRN severe agitation      Care Discussed with Consultants/Other Providers [ ] YES  [ ] NO
Patient is a 86y old  Female who presents with a chief complaint of Ear ache and confusion (10 Dec 2017 22:05)    doing much better, no agitation, c/o rt. sided HA/ facial pain   agree for MRI     INTERVAL HPI/OVERNIGHT EVENTS:  T(C): 37.3 (12-12-17 @ 09:05), Max: 37.3 (12-12-17 @ 09:05)  HR: 94 (12-12-17 @ 09:05) (82 - 94)  BP: 137/73 (12-12-17 @ 09:05) (137/73 - 171/75)  RR: 18 (12-12-17 @ 09:05) (18 - 18)  SpO2: 95% (12-12-17 @ 09:05) (94% - 96%)  Wt(kg): --  I&O's Summary    11 Dec 2017 07:01  -  12 Dec 2017 07:00  --------------------------------------------------------  IN: 260 mL / OUT: 300 mL / NET: -40 mL    12 Dec 2017 07:01  -  12 Dec 2017 13:30  --------------------------------------------------------  IN: 180 mL / OUT: 0 mL / NET: 180 mL        PAST MEDICAL & SURGICAL HISTORY:  Hyperlipidemia, unspecified hyperlipidemia type  S/P partial colectomy      SOCIAL HISTORY  Alcohol:  Tobacco:  Illicit substance use:    FAMILY HISTORY:    REVIEW OF SYSTEMS:  CONSTITUTIONAL: No fever, weight loss, or fatigue  EYES: No eye pain, visual disturbances, or discharge  ENMT:  No difficulty hearing, tinnitus, vertigo; No sinus or throat pain  NECK: No pain or stiffness  RESPIRATORY: No cough, wheezing, chills or hemoptysis; No shortness of breath  CARDIOVASCULAR: No chest pain, palpitations, dizziness, or leg swelling  GASTROINTESTINAL: No abdominal or epigastric pain. No nausea, vomiting, or hematemesis; No diarrhea or constipation. No melena or hematochezia.  GENITOURINARY: No dysuria, frequency, hematuria, or incontinence  NEUROLOGICAL: No headaches, memory loss, loss of strength, numbness, or tremors  SKIN: No itching, burning, rashes, or lesions   LYMPH NODES: No enlarged glands  ENDOCRINE: No heat or cold intolerance; No hair loss  MUSCULOSKELETAL: No joint pain or swelling; No muscle, back, or extremity pain  PSYCHIATRIC: No depression, anxiety, mood swings, or difficulty sleeping  HEME/LYMPH: No easy bruising, or bleeding gums  ALLERY AND IMMUNOLOGIC: No hives or eczema    RADIOLOGY & ADDITIONAL TESTS:    Imaging Personally Reviewed:  [ ] YES  [ ] NO    Consultant(s) Notes Reviewed:  [ ] YES  [ ] NO    PHYSICAL EXAM:  GENERAL: NAD, well-groomed, well-developed  HEAD:  Atraumatic, Normocephalic  EYES: EOMI, PERRLA, conjunctiva and sclera clear  ENMT: No tonsillar erythema, exudates, or enlargement; Moist mucous membranes, Good dentition, No lesions  NECK: Supple, No JVD, Normal thyroid  NERVOUS SYSTEM:  Alert & Oriented X3, Good concentration; Motor Strength 5/5 B/L upper and lower extremities; DTRs 2+ intact and symmetric  CHEST/LUNG: Clear to percussion bilaterally; No rales, rhonchi, wheezing, or rubs  HEART: Regular rate and rhythm; No murmurs, rubs, or gallops  ABDOMEN: Soft, Nontender, Nondistended; Bowel sounds present  EXTREMITIES:  2+ Peripheral Pulses, No clubbing, cyanosis, or edema  LYMPH: No lymphadenopathy noted  SKIN: No rashes or lesions    LABS:                        13.0   7.96  )-----------( 262      ( 11 Dec 2017 10:43 )             38.5     12-11    137  |  106  |  15  ----------------------------<  67<L>  4.5   |  14<L>  |  0.85    Ca    9.3      11 Dec 2017 10:43    TPro  7.4  /  Alb  4.2  /  TBili  0.4  /  DBili  x   /  AST  18  /  ALT  14  /  AlkPhos  77  12-10        CAPILLARY BLOOD GLUCOSE                MEDICATIONS  (STANDING):  acetaminophen   Tablet. 650 milliGRAM(s) Oral once  donepezil 5 milliGRAM(s) Oral at bedtime  haloperidol     Tablet 0.5 milliGRAM(s) Oral <User Schedule>  haloperidol     Tablet 1 milliGRAM(s) Oral at bedtime  meclizine 12.5 milliGRAM(s) Oral two times a day    MEDICATIONS  (PRN):  haloperidol    Injectable 1 milliGRAM(s) IV Push every 6 hours PRN severe agitation      Care Discussed with Consultants/Other Providers [ ] YES  [ ] NO
Patient is a 86y old  Female who presents with a chief complaint of Ear ache and confusion (10 Dec 2017 22:05)    pt. seen and examined, NAD    INTERVAL HPI/OVERNIGHT EVENTS:  T(C): 36.6 (12-16-17 @ 16:26), Max: 36.7 (12-15-17 @ 22:27)  HR: 98 (12-16-17 @ 16:26) (81 - 98)  BP: 120/72 (12-16-17 @ 16:26) (120/72 - 146/81)  RR: 18 (12-16-17 @ 16:26) (17 - 18)  SpO2: 99% (12-16-17 @ 16:26) (94% - 99%)  Wt(kg): --  I&O's Summary    15 Dec 2017 07:01  -  16 Dec 2017 07:00  --------------------------------------------------------  IN: 600 mL / OUT: 0 mL / NET: 600 mL    16 Dec 2017 07:01  -  16 Dec 2017 19:04  --------------------------------------------------------  IN: 600 mL / OUT: 0 mL / NET: 600 mL        PAST MEDICAL & SURGICAL HISTORY:  Hyperlipidemia, unspecified hyperlipidemia type  S/P partial colectomy      SOCIAL HISTORY  Alcohol:  Tobacco:  Illicit substance use:    FAMILY HISTORY:    REVIEW OF SYSTEMS:  CONSTITUTIONAL: No fever, weight loss, or fatigue  EYES: No eye pain, visual disturbances, or discharge  ENMT:  No difficulty hearing, tinnitus, vertigo; No sinus or throat pain  NECK: No pain or stiffness  RESPIRATORY: No cough, wheezing, chills or hemoptysis; No shortness of breath  CARDIOVASCULAR: No chest pain, palpitations, dizziness, or leg swelling  GASTROINTESTINAL: No abdominal or epigastric pain. No nausea, vomiting, or hematemesis; No diarrhea or constipation. No melena or hematochezia.  GENITOURINARY: No dysuria, frequency, hematuria, or incontinence  NEUROLOGICAL: No headaches, memory loss, loss of strength, numbness, or tremors  SKIN: No itching, burning, rashes, or lesions   LYMPH NODES: No enlarged glands  ENDOCRINE: No heat or cold intolerance; No hair loss  MUSCULOSKELETAL: No joint pain or swelling; No muscle, back, or extremity pain  PSYCHIATRIC: No depression, anxiety, mood swings, or difficulty sleeping  HEME/LYMPH: No easy bruising, or bleeding gums  ALLERY AND IMMUNOLOGIC: No hives or eczema    RADIOLOGY & ADDITIONAL TESTS:    Imaging Personally Reviewed:  [ ] YES  [ ] NO    Consultant(s) Notes Reviewed:  [ ] YES  [ ] NO    PHYSICAL EXAM:  GENERAL: NAD, well-groomed, well-developed  HEAD:  Atraumatic, Normocephalic  EYES: EOMI, PERRLA, conjunctiva and sclera clear  ENMT: No tonsillar erythema, exudates, or enlargement; Moist mucous membranes, Good dentition, No lesions  NECK: Supple, No JVD, Normal thyroid  NERVOUS SYSTEM:  Alert & Oriented X3, Good concentration; Motor Strength 5/5 B/L upper and lower extremities; DTRs 2+ intact and symmetric  CHEST/LUNG: Clear to percussion bilaterally; No rales, rhonchi, wheezing, or rubs  HEART: Regular rate and rhythm; No murmurs, rubs, or gallops  ABDOMEN: Soft, Nontender, Nondistended; Bowel sounds present  EXTREMITIES:  2+ Peripheral Pulses, No clubbing, cyanosis, or edema  LYMPH: No lymphadenopathy noted  SKIN: No rashes or lesions    LABS:                        14.0   11.17 )-----------( 239      ( 16 Dec 2017 08:19 )             41.6               CAPILLARY BLOOD GLUCOSE                MEDICATIONS  (STANDING):  acetaminophen   Tablet. 650 milliGRAM(s) Oral once  acetaminophen   Tablet. 650 milliGRAM(s) Oral two times a day  donepezil 5 milliGRAM(s) Oral at bedtime  haloperidol     Tablet 0.5 milliGRAM(s) Oral at bedtime    MEDICATIONS  (PRN):  haloperidol    Injectable 1 milliGRAM(s) IntraMuscular every 6 hours PRN severe agitation      Care Discussed with Consultants/Other Providers [ ] YES  [ ] NO
Patient is a 86y old  Female who presents with a chief complaint of Ear ache and confusion (10 Dec 2017 22:05)    pt. seen and examined, NAD    INTERVAL HPI/OVERNIGHT EVENTS:  T(C): 36.8 (12-14-17 @ 15:40), Max: 36.8 (12-14-17 @ 08:38)  HR: 88 (12-14-17 @ 15:40) (69 - 99)  BP: 127/77 (12-14-17 @ 15:40) (127/77 - 145/83)  RR: 18 (12-14-17 @ 15:40) (18 - 18)  SpO2: 95% (12-14-17 @ 15:40) (95% - 97%)  Wt(kg): --  I&O's Summary    13 Dec 2017 07:01  -  14 Dec 2017 07:00  --------------------------------------------------------  IN: 640 mL / OUT: 200 mL / NET: 440 mL    14 Dec 2017 07:01  -  14 Dec 2017 19:42  --------------------------------------------------------  IN: 300 mL / OUT: 0 mL / NET: 300 mL        PAST MEDICAL & SURGICAL HISTORY:  Hyperlipidemia, unspecified hyperlipidemia type  S/P partial colectomy      SOCIAL HISTORY  Alcohol:  Tobacco:  Illicit substance use:    FAMILY HISTORY:    REVIEW OF SYSTEMS:  CONSTITUTIONAL: No fever, weight loss, or fatigue  EYES: No eye pain, visual disturbances, or discharge  ENMT:  No difficulty hearing, tinnitus, vertigo; No sinus or throat pain  NECK: No pain or stiffness  RESPIRATORY: No cough, wheezing, chills or hemoptysis; No shortness of breath  CARDIOVASCULAR: No chest pain, palpitations, dizziness, or leg swelling  GASTROINTESTINAL: No abdominal or epigastric pain. No nausea, vomiting, or hematemesis; No diarrhea or constipation. No melena or hematochezia.  GENITOURINARY: No dysuria, frequency, hematuria, or incontinence  NEUROLOGICAL: No headaches, memory loss, loss of strength, numbness, or tremors  SKIN: No itching, burning, rashes, or lesions   LYMPH NODES: No enlarged glands  ENDOCRINE: No heat or cold intolerance; No hair loss  MUSCULOSKELETAL: No joint pain or swelling; No muscle, back, or extremity pain  PSYCHIATRIC: No depression, anxiety, mood swings, or difficulty sleeping  HEME/LYMPH: No easy bruising, or bleeding gums  ALLERY AND IMMUNOLOGIC: No hives or eczema    RADIOLOGY & ADDITIONAL TESTS:    Imaging Personally Reviewed:  [ ] YES  [ ] NO    Consultant(s) Notes Reviewed:  [ ] YES  [ ] NO    PHYSICAL EXAM:  GENERAL: NAD, well-groomed, well-developed  HEAD:  Atraumatic, Normocephalic  EYES: EOMI, PERRLA, conjunctiva and sclera clear  ENMT: No tonsillar erythema, exudates, or enlargement; Moist mucous membranes, Good dentition, No lesions  NECK: Supple, No JVD, Normal thyroid  NERVOUS SYSTEM:  Alert & Oriented X3, Good concentration; Motor Strength 5/5 B/L upper and lower extremities; DTRs 2+ intact and symmetric  CHEST/LUNG: Clear to percussion bilaterally; No rales, rhonchi, wheezing, or rubs  HEART: Regular rate and rhythm; No murmurs, rubs, or gallops  ABDOMEN: Soft, Nontender, Nondistended; Bowel sounds present  EXTREMITIES:  2+ Peripheral Pulses, No clubbing, cyanosis, or edema  LYMPH: No lymphadenopathy noted  SKIN: No rashes or lesions    LABS:                        14.3   10.90 )-----------( 252      ( 14 Dec 2017 07:42 )             43.1     12-14    135  |  100  |  12  ----------------------------<  98  4.1   |  20<L>  |  0.78    Ca    9.8      14 Dec 2017 07:44          CAPILLARY BLOOD GLUCOSE                MEDICATIONS  (STANDING):  acetaminophen   Tablet. 650 milliGRAM(s) Oral once  donepezil 5 milliGRAM(s) Oral at bedtime  haloperidol     Tablet 0.5 milliGRAM(s) Oral <User Schedule>  haloperidol     Tablet 1 milliGRAM(s) Oral at bedtime  meclizine 12.5 milliGRAM(s) Oral two times a day    MEDICATIONS  (PRN):  haloperidol    Injectable 1 milliGRAM(s) IV Push every 6 hours PRN severe agitation      Care Discussed with Consultants/Other Providers [ ] YES  [ ] NO
Patient is a 86y old  Female who presents with a chief complaint of Ear ache and confusion (10 Dec 2017 22:05)    pt. seen and examined, denies any HA , c/o mild ear ache. no agitation    INTERVAL HPI/OVERNIGHT EVENTS:  T(C): 36.6 (12-11-17 @ 14:53), Max: 36.7 (12-10-17 @ 22:04)  HR: 84 (12-11-17 @ 14:53) (77 - 84)  BP: 153/77 (12-11-17 @ 14:53) (146/74 - 153/77)  RR: 18 (12-11-17 @ 14:53) (18 - 18)  SpO2: 96% (12-11-17 @ 14:53) (96% - 99%)  Wt(kg): --  I&O's Summary    11 Dec 2017 07:01  -  11 Dec 2017 19:59  --------------------------------------------------------  IN: 160 mL / OUT: 300 mL / NET: -140 mL        PAST MEDICAL & SURGICAL HISTORY:  Hyperlipidemia, unspecified hyperlipidemia type  S/P partial colectomy      SOCIAL HISTORY  Alcohol:  Tobacco:  Illicit substance use:    FAMILY HISTORY:    REVIEW OF SYSTEMS:  CONSTITUTIONAL: No fever, weight loss, or fatigue  EYES: No eye pain, visual disturbances, or discharge  ENMT:  No difficulty hearing, tinnitus, vertigo; No sinus or throat pain  NECK: No pain or stiffness  RESPIRATORY: No cough, wheezing, chills or hemoptysis; No shortness of breath  CARDIOVASCULAR: No chest pain, palpitations, dizziness, or leg swelling  GASTROINTESTINAL: No abdominal or epigastric pain. No nausea, vomiting, or hematemesis; No diarrhea or constipation. No melena or hematochezia.  GENITOURINARY: No dysuria, frequency, hematuria, or incontinence  NEUROLOGICAL: No headaches, memory loss, loss of strength, numbness, or tremors  SKIN: No itching, burning, rashes, or lesions   LYMPH NODES: No enlarged glands  ENDOCRINE: No heat or cold intolerance; No hair loss  MUSCULOSKELETAL: No joint pain or swelling; No muscle, back, or extremity pain  PSYCHIATRIC: No depression, anxiety, mood swings, or difficulty sleeping  HEME/LYMPH: No easy bruising, or bleeding gums  ALLERY AND IMMUNOLOGIC: No hives or eczema    RADIOLOGY & ADDITIONAL TESTS:    Imaging Personally Reviewed:  [ ] YES  [ ] NO    Consultant(s) Notes Reviewed:  [ ] YES  [ ] NO    PHYSICAL EXAM:  GENERAL: NAD, well-groomed, well-developed  HEAD:  Atraumatic, Normocephalic  EYES: EOMI, PERRLA, conjunctiva and sclera clear  ENMT: No tonsillar erythema, exudates, or enlargement; Moist mucous membranes, Good dentition, No lesions  NECK: Supple, No JVD, Normal thyroid  NERVOUS SYSTEM:  Alert & Oriented X3, Good concentration; Motor Strength 5/5 B/L upper and lower extremities; DTRs 2+ intact and symmetric  CHEST/LUNG: Clear to percussion bilaterally; No rales, rhonchi, wheezing, or rubs  HEART: Regular rate and rhythm; No murmurs, rubs, or gallops  ABDOMEN: Soft, Nontender, Nondistended; Bowel sounds present  EXTREMITIES:  2+ Peripheral Pulses, No clubbing, cyanosis, or edema  LYMPH: No lymphadenopathy noted  SKIN: No rashes or lesions    LABS:                        13.0   7.96  )-----------( 262      ( 11 Dec 2017 10:43 )             38.5     12-11    137  |  106  |  15  ----------------------------<  67<L>  4.5   |  14<L>  |  0.85    Ca    9.3      11 Dec 2017 10:43    TPro  7.4  /  Alb  4.2  /  TBili  0.4  /  DBili  x   /  AST  18  /  ALT  14  /  AlkPhos  77  12-10        CAPILLARY BLOOD GLUCOSE                MEDICATIONS  (STANDING):  donepezil 5 milliGRAM(s) Oral at bedtime  haloperidol     Tablet 0.5 milliGRAM(s) Oral <User Schedule>  haloperidol     Tablet 1 milliGRAM(s) Oral at bedtime  meclizine 12.5 milliGRAM(s) Oral two times a day    MEDICATIONS  (PRN):  haloperidol    Injectable 1 milliGRAM(s) IV Push every 6 hours PRN severe agitation      Care Discussed with Consultants/Other Providers [ ] YES  [ ] NO
Patient is a 86y old  Female who presents with a chief complaint of Ear ache and confusion (10 Dec 2017 22:05)    pt. seen and examined, family bedside  c/o some neck pain, C-spine MRI ordered    INTERVAL HPI/OVERNIGHT EVENTS:  T(C): 36.7 (12-13-17 @ 22:41), Max: 36.7 (12-13-17 @ 08:41)  HR: 99 (12-13-17 @ 22:41) (76 - 99)  BP: 132/76 (12-13-17 @ 22:41) (132/76 - 152/801)  RR: 18 (12-13-17 @ 22:41) (18 - 18)  SpO2: 97% (12-13-17 @ 22:41) (96% - 99%)  Wt(kg): --  I&O's Summary    12 Dec 2017 07:01  -  13 Dec 2017 07:00  --------------------------------------------------------  IN: 540 mL / OUT: 100 mL / NET: 440 mL    13 Dec 2017 07:01  -  13 Dec 2017 22:49  --------------------------------------------------------  IN: 400 mL / OUT: 0 mL / NET: 400 mL        PAST MEDICAL & SURGICAL HISTORY:  Hyperlipidemia, unspecified hyperlipidemia type  S/P partial colectomy      SOCIAL HISTORY  Alcohol:  Tobacco:  Illicit substance use:    FAMILY HISTORY:    REVIEW OF SYSTEMS:  CONSTITUTIONAL: No fever, weight loss, or fatigue  EYES: No eye pain, visual disturbances, or discharge  ENMT:  No difficulty hearing, tinnitus, vertigo; No sinus or throat pain  NECK: No pain or stiffness  RESPIRATORY: No cough, wheezing, chills or hemoptysis; No shortness of breath  CARDIOVASCULAR: No chest pain, palpitations, dizziness, or leg swelling  GASTROINTESTINAL: No abdominal or epigastric pain. No nausea, vomiting, or hematemesis; No diarrhea or constipation. No melena or hematochezia.  GENITOURINARY: No dysuria, frequency, hematuria, or incontinence  NEUROLOGICAL: No headaches, memory loss, loss of strength, numbness, or tremors  SKIN: No itching, burning, rashes, or lesions   LYMPH NODES: No enlarged glands  ENDOCRINE: No heat or cold intolerance; No hair loss  MUSCULOSKELETAL: No joint pain or swelling; No muscle, back, or extremity pain  PSYCHIATRIC: No depression, anxiety, mood swings, or difficulty sleeping  HEME/LYMPH: No easy bruising, or bleeding gums  ALLERY AND IMMUNOLOGIC: No hives or eczema    RADIOLOGY & ADDITIONAL TESTS:    Imaging Personally Reviewed:  [ ] YES  [ ] NO    Consultant(s) Notes Reviewed:  [ ] YES  [ ] NO    PHYSICAL EXAM:  GENERAL: NAD, well-groomed, well-developed  HEAD:  Atraumatic, Normocephalic  EYES: EOMI, PERRLA, conjunctiva and sclera clear  ENMT: No tonsillar erythema, exudates, or enlargement; Moist mucous membranes, Good dentition, No lesions  NECK: Supple, No JVD, Normal thyroid  NERVOUS SYSTEM:  Alert & Oriented X3, Good concentration; Motor Strength 5/5 B/L upper and lower extremities; DTRs 2+ intact and symmetric  CHEST/LUNG: Clear to percussion bilaterally; No rales, rhonchi, wheezing, or rubs  HEART: Regular rate and rhythm; No murmurs, rubs, or gallops  ABDOMEN: Soft, Nontender, Nondistended; Bowel sounds present  EXTREMITIES:  2+ Peripheral Pulses, No clubbing, cyanosis, or edema  LYMPH: No lymphadenopathy noted  SKIN: No rashes or lesions    LABS:              CAPILLARY BLOOD GLUCOSE                MEDICATIONS  (STANDING):  acetaminophen   Tablet. 650 milliGRAM(s) Oral once  donepezil 5 milliGRAM(s) Oral at bedtime  haloperidol     Tablet 0.5 milliGRAM(s) Oral <User Schedule>  haloperidol     Tablet 1 milliGRAM(s) Oral at bedtime  meclizine 12.5 milliGRAM(s) Oral two times a day    MEDICATIONS  (PRN):  haloperidol    Injectable 1 milliGRAM(s) IV Push every 6 hours PRN severe agitation      Care Discussed with Consultants/Other Providers [ ] YES  [ ] NO
Patient is a 86y old  Female who presents with a chief complaint of Ear ache and confusion (10 Dec 2017 22:05)    pt. seen and examined, family bedside. denies any c/o    INTERVAL HPI/OVERNIGHT EVENTS:  T(C): 36.4 (12-15-17 @ 15:41), Max: 36.9 (12-15-17 @ 07:14)  HR: 98 (12-15-17 @ 15:41) (84 - 98)  BP: 123/76 (12-15-17 @ 15:41) (123/76 - 128/76)  RR: 18 (12-15-17 @ 15:41) (18 - 18)  SpO2: 95% (12-15-17 @ 15:41) (95% - 97%)  Wt(kg): --  I&O's Summary    14 Dec 2017 07:01  -  15 Dec 2017 07:00  --------------------------------------------------------  IN: 300 mL / OUT: 0 mL / NET: 300 mL    15 Dec 2017 07:01  -  15 Dec 2017 16:21  --------------------------------------------------------  IN: 360 mL / OUT: 0 mL / NET: 360 mL        PAST MEDICAL & SURGICAL HISTORY:  Hyperlipidemia, unspecified hyperlipidemia type  S/P partial colectomy      SOCIAL HISTORY  Alcohol:  Tobacco:  Illicit substance use:    FAMILY HISTORY:    REVIEW OF SYSTEMS:  CONSTITUTIONAL: No fever, weight loss, or fatigue  EYES: No eye pain, visual disturbances, or discharge  ENMT:  No difficulty hearing, tinnitus, vertigo; No sinus or throat pain  NECK: No pain or stiffness  RESPIRATORY: No cough, wheezing, chills or hemoptysis; No shortness of breath  CARDIOVASCULAR: No chest pain, palpitations, dizziness, or leg swelling  GASTROINTESTINAL: No abdominal or epigastric pain. No nausea, vomiting, or hematemesis; No diarrhea or constipation. No melena or hematochezia.  GENITOURINARY: No dysuria, frequency, hematuria, or incontinence  NEUROLOGICAL: No headaches, memory loss, loss of strength, numbness, or tremors  SKIN: No itching, burning, rashes, or lesions   LYMPH NODES: No enlarged glands  ENDOCRINE: No heat or cold intolerance; No hair loss  MUSCULOSKELETAL: No joint pain or swelling; No muscle, back, or extremity pain  PSYCHIATRIC: No depression, anxiety, mood swings, or difficulty sleeping  HEME/LYMPH: No easy bruising, or bleeding gums  ALLERY AND IMMUNOLOGIC: No hives or eczema    RADIOLOGY & ADDITIONAL TESTS:    Imaging Personally Reviewed:  [ ] YES  [ ] NO    Consultant(s) Notes Reviewed:  [ ] YES  [ ] NO    PHYSICAL EXAM:  GENERAL: NAD, well-groomed, well-developed  HEAD:  Atraumatic, Normocephalic  EYES: EOMI, PERRLA, conjunctiva and sclera clear  ENMT: No tonsillar erythema, exudates, or enlargement; Moist mucous membranes, Good dentition, No lesions  NECK: Supple, No JVD, Normal thyroid  NERVOUS SYSTEM:  Alert & Oriented X3, Good concentration; Motor Strength 5/5 B/L upper and lower extremities; DTRs 2+ intact and symmetric  CHEST/LUNG: Clear to percussion bilaterally; No rales, rhonchi, wheezing, or rubs  HEART: Regular rate and rhythm; No murmurs, rubs, or gallops  ABDOMEN: Soft, Nontender, Nondistended; Bowel sounds present  EXTREMITIES:  2+ Peripheral Pulses, No clubbing, cyanosis, or edema  LYMPH: No lymphadenopathy noted  SKIN: No rashes or lesions    LABS:                        14.5   10.74 )-----------( 238      ( 15 Dec 2017 07:31 )             42.7     12-14    135  |  100  |  12  ----------------------------<  98  4.1   |  20<L>  |  0.78    Ca    9.8      14 Dec 2017 07:44          CAPILLARY BLOOD GLUCOSE                MEDICATIONS  (STANDING):  acetaminophen   Tablet. 650 milliGRAM(s) Oral once  acetaminophen   Tablet. 650 milliGRAM(s) Oral two times a day  donepezil 5 milliGRAM(s) Oral at bedtime  haloperidol     Tablet 0.5 milliGRAM(s) Oral at bedtime    MEDICATIONS  (PRN):  haloperidol    Injectable 1 milliGRAM(s) IntraMuscular every 6 hours PRN severe agitation      Care Discussed with Consultants/Other Providers [ ] YES  [ ] NO
Patient is an 85 year old woman admitted with right ear pain with radiation to the right jaw. Patient states she underwent cleaning of the right ear and subsequently has noted severe pain which radiates from behind the right ear to the right jaw. She was later placed on antibiotics for possible ear infection which she reportedly discontinued due to a rash. Presently she states the pain has recurred and she again feels the pain radiating from behind the right ear to the right jaw.  She  states at times notes the pain and at other times does not note the pain.    She denies other pain involving the head.       Exam: Awake, alert, appropriate            Pupils 2.5mm reactive, EOM intact, no nystagmus,             Neck supple            CN II-XII intact             Motor tone and strength normal             < from: MR Head w/wo IV Cont (12.12.17 @ 20:44) >    Comparison is made with the prior brain CT of 12/10/2017..    The fourth, third and lateral ventricles are mildly enlarged consistent   with mild atrophy. There is no hemorrhage, mass or shift of the midline   structures. Minimal small vessel white matter ischemic changes are   identified.    There is no acute infarcts or hemorrhage. After contrast infusion there   is normal vascular enhancement. No abnormal parenchymal or leptomeningeal   enhancement is identified.    There has been previous bilateral lens replacement surgery.    Impression: Age-appropriate lesional and ischemic gliotic changes. No   acute infarcts, hemorrhage or mass. No evidence of brain metastases. No   abnormal enhancement.
Patient is an 85 year old woman admitted with right ear pain with radiation to the right jaw. Patient states she underwent cleaning of the right ear and subsequently has noted severe pain which radiates from behind the right ear to the right jaw. She was later placed on antibiotics for possible ear infection which she reportedly discontinued due to a rash. Presently she states the pain has recurred and she again feels the pain radiating from behind the right ear to the right jaw.  She  states at times notes the pain and at other times does not note the pain.    She denies other pain involving the head.      Exam: Awake, alert, appropriate            Pupils 2.5mm reactive, EOM intact, no nystagmus,             Neck supple            CN II-XII intact             Motor tone and strength normal                        13.0   7.96  )-----------( 262      ( 11 Dec 2017 10:43 )             38.5     Sedimentation Rate, Erythrocyte (12.11.17 @ 10:43)    Sedimentation Rate, Erythrocyte: 20 mm/hr    12-11    137  |  106  |  15  ----------------------------<  67<L>  4.5   |  14<L>  |  0.85    Ca    9.3      11 Dec 2017 10:43    TPro  7.4  /  Alb  4.2  /  TBili  0.4  /  DBili  x   /  AST  18  /  ALT  14  /  AlkPhos  77  12-10
Patient is an 85 year old woman admitted with right ear pain with radiation to the right jaw. Patient states she underwent cleaning of the right ear and subsequently has noted severe pain which radiates from behind the right ear to the right jaw. She was later placed on antibiotics for possible ear infection which she reportedly discontinued due to a rash. Presently she states the pain has recurred and she again feels the pain radiating from behind the right ear to the right jaw.  She  states at times notes the pain and at other times does not note the pain.    She denies other pain involving the head.  Presently she states the pain is mild radiating from behind the right ear to the jaw.      Exam: Awake, alert, appropriate            Pupils 2.5mm reactive, EOM intact, no nystagmus,             Neck supple            CN II-XII intact             Motor tone and strength normal
Patient is an 85 year old woman admitted with right ear pain with radiation to the right jaw. Patient states she underwent cleaning of the right ear and subsequently has noted severe pain which radiates from behind the right ear to the right jaw. She was later placed on antibiotics for possible ear infection which she reportedly discontinued due to a rash. Presently she states the pain has recurred and she again feels the pain radiating from behind the right ear to the right jaw.  She  states at times notes the pain and at other times does not note the pain.    She denies other pain involving the head.  Presently she states the pain is mild radiating from behind the right ear to the jaw.      Exam: Awake, alert, appropriate            Pupils 2.5mm reactive, EOM intact, no nystagmus,             Neck supple            CN II-XII intact             Motor tone and strength normal               < from: MR Cervical Spine w/wo IV Cont (12.13.17 @ 22:18) >    Reversal of the normal cervical lordosis is seen.    The vertebral body body height alignment and marrow signal appear normal.    Disc desiccation is seen throughout the cervical spine region.    C2-3: Normal.    C3-4: Disc bulge and bilateral hypertrophic facet joint changes seen.   Moderate narrowing of the left neural foramen is seen.    C4-5: Disc bulge and central disc protrusion is seen. Bilateral   hypertrophic facet joint changes are seen. Mild narrowing of the spinal   canal. Moderate narrowing of the right neural foramen and severe   narrowing of the left neural foramen.    C5-6: Disc osteophyte complex is seen. Hypertrophic change is seen   involving both facet and uncovertebral joints. Mild narrowing of the   spinal canal. Severe narrowing of both neural foramen.    C6-7: Disc osteophyte complex is seen. Hypertrophic change is seen   involving both facet and uncovertebral joints. Severe narrowing of both   neural foramen.    C7-T1: Normal.    The spinal cord demonstrates normal signal and caliber.    No abnormal areas of enhancement is seen.    Impression: Degenerative changes as described above.
Negative

## 2020-08-19 NOTE — PROGRESS NOTE BEHAVIORAL HEALTH - NSBHCONSULTMEDPRNREASON_PSY_A_CORE
CARDIOVASCULAR MEDICINE, FOLLOW-UP    COVID-19 Screening:    • Does the patient OR patient’s household members have any of the following symptoms?  o Temperature: Fever ?100.0°F or ?37.8°C?  No  o Respiratory symptoms: New or worsening cough, shortness of breath, or sore throat? No  o GI symptoms: New onset of nausea, vomiting or diarrhea?  No  o Miscellaneous: New onset of loss of taste or smell, chills, repeated shaking with chills, muscle pain or headache?  No  • Has the patient or a household member tested positive for COVID-19 in the last 14 days?  No  • Has the patient or a household member been tested for COVID-19 and are waiting for the results?  No    CHIEF COMPLAINT:    PVCs    HISTORY OF PRESENT ILLNESS:    Donita Andrew is a 89 y/o female with cerebral microvascular disease, CKD stage III, HTN and HLD, who presents for follow-up with Cardiology.    She'd established care in , related to HTN, after evaluation at a walk-in clinic for palpitations and slow heart rate. Holter monitoring demonstrated frequent PVCs and 3 short runs of NSVT. Echo showed diastolic dysfunction, with no significant valve abnormalities.     She was started on Toprol XL 25 mg daily at last office visit, and a repeat Holter was obtained (results below).    She's been doing quite well recently. Specifically, she denies any episodes of chest pain, new/worsened SOB/HOUGH, lower extremity edema, unexplained weight gain, orthopnea/PND, pre-syncope or syncope.    Exercise: She walks for 15 minutes on a treadmill every day, and is able to complete ADLs w/o issues/concerns.    CARDIAC RISK FACTORS:    Tobacco: former smoker (quit , 33 pack-years)  Hypertension: yes  Hyperlipidemia: yes  Diabetes: no  Family History:     Mother and sister both with \"bad hearts,\"  at ages 70 and 76, respectively    CARDIAC DIAGNOSTICS:    HOLTER, 2020:  1. This was a 24-hour Holter monitor performed for PVCs, with underlying sinus rhythm and 
average heart rate 71 bpm.   2. No patient-reported symptomatic transmissions.   3. Mild burden of supraventricular and very mild burden of ventricular ectopy, represented predominantly as isolated PACs and PVCs, respectively. No significant supraventricular or ventricular arrhythmia was identified.   4. No pauses and no atrial fibrillation were detected.    CT CHEST, 6/10/2020:  Stable CT scan of the chest with bilateral pulmonary nodules.     ECHO, 5/22/2020:  1. Hyperdynamic left ventricular systolic function with mid cavitary obstruction. With Valsalva the peak gradient is 16 mm Hg. No regional wall motion abnormalities.  2. Normal left ventricular size and wall thickness. Hypertrophy of the papillary muscles.  3. Grade I/IV diastolic dysfunction (abnormal relaxation filling pattern), normal to mildly elevated filling pressures.  4. Mildly increased left atrial size.  5. Normal right ventricular size and systolic function.  6. No hemodynamically significant valve abnormalities.     US CAROTID DUPLEX, 5/15/2020:  Mild atherosclerosis. Based on velocity criteria, there is an estimated less than 50% stenosis within the internal carotid arteries bilaterally.     NM STRESS TEST, 1/30/2019:  1. Normal myocardial perfusion scans following a regadenoson injection and at rest.   2. Normal post-regadenoson LV systolic function.   3. Cardiac Risk Assessment: low     EKG, 1/6/2019 ([facility]):  Normal sinus rhythm  Normal ECG    No outpatient medications have been marked as taking for the 8/19/20 encounter (Appointment) with Chilango Rowe MD.     ALLERGIES:  No Known Allergies  Past Medical History:   Diagnosis Date   • Adenocarcinoma of lung (CMS/[facility]) 12/05/2017   • Arthritis of hip - L, severe with mod Sx 7/8/2016   • Essential hypertension    • Herpes zoster without mention of complication 1995    L shoulder   • Osteoarthrosis, unspecified whether generalized or localized, unspecified site    • Pure hypercholesterolemia 
   • Reflux esophagitis    • Tobacco use disorder 2011     Past Surgical History:   Procedure Laterality Date   • Appendectomy  193   • Colonoscopy diagnostic thru stoma  95,00,06    polyps in 95; none 00,06; extensive, diverticuloisis   • Dexa bone density axial skeleton  97,03    nl; T = +0.2 and 0.0   • Ekg report  03    nl   • Joint replacement Left 2019   • Remove tonsils/adenoids,<13 y/o  1926   • Remv cataract extracap insert lens Right 2014    IOL right eye ZCBOO 22.0 Dr. Marcum   • Remv cataract extracap insert lens Left 2014    left GPS ZCBOO 21.5   • Total hip replacement Left 2019   • Total hip replacement Left 2019   • X-ray chest 2 vw  04    hyperexpanded lungs   • Xray mammogram screening bilat  99,00,01,02,03,04,05    nl     Family History   Problem Relation Age of Onset   • Congestive Heart Failure Mother    • Heart Father    • Congestive Heart Failure Sister    • Cancer Brother    • Musculoskeletal Son         knee replacement     Social History     Socioeconomic History   • Marital status:      Spouse name: Aleksey   • Number of children: 2   • Years of education: 13   • Highest education level: Not on file   Occupational History   • Occupation: Retired   Social Needs   • Financial resource strain: Not on file   • Food insecurity     Worry: Not on file     Inability: Not on file   • Transportation needs     Medical: Not on file     Non-medical: Not on file   Tobacco Use   • Smoking status: Former Smoker     Packs/day: 0.50     Years: 66.00     Pack years: 33.00     Types: Cigarettes     Start date: 1951     Quit date: 10/20/2018     Years since quittin.8   • Smokeless tobacco: Never Used   • Tobacco comment: 3 cigarettes/day as of 2017; denies 19   Substance and Sexual Activity   • Alcohol use: Yes     Alcohol/week: 0.0 standard drinks     Frequency: Monthly or less     Drinks per session: 1 or 2     Binge frequency: Never     Comment: 
\"once in a blue moon\"   • Drug use: No     Comment: 1-29-19   • Sexual activity: Never   Lifestyle   • Physical activity     Days per week: Not on file     Minutes per session: Not on file   • Stress: Not on file   Relationships   • Social connections     Talks on phone: Not on file     Gets together: Not on file     Attends [employer] service: Not on file     Active member of club or organization: Not on file     Attends meetings of clubs or organizations: Not on file     Relationship status: Not on file   • Intimate partner violence     Fear of current or ex partner: Not on file     Emotionally abused: Not on file     Physically abused: Not on file     Forced sexual activity: Not on file   Other Topics Concern   • Not on file   Social History Narrative   • Not on file     REVIEW OF SYSTEMS:    A 12-point review of systems was conducted and all items were found to be negative, other than those stated as positive per the HPI.    PHYSICAL EXAMINATION:    Vitals:    08/19/20 1347   BP: 134/68   Pulse: (!) 56     GENERAL: Elderly  female, in no acute distress.  EYES: Normal in appearance, without conjunctival injection. Pupils equal, round and reactive to light.  ENT: Mucous membranes moist, without oral pallor.  CARDIOVASCULAR: Regular rate and rhythm (with ectopy), normal S1 and S2; no murmurs, rubs or gallops. No carotid bruit. No jugular venous distention.  RESPIRATORY: Normal respiratory effort; lungs to auscultation bilaterally.  GASTROINTESTINAL: Obese; soft, non-tender, non-distended. No palpable masses.  EXTREMITIES: WWP, no lower extremity edema.  SKIN: Warm, dry and intact; no rashes or lesions.  MSK: Hands/digits without clubbing or cyanosis.  NEUROLOGIC: Alert and oriented x3.  PSYCHIATRIC: Normal mood and affect.    LABS:    Recent Labs   Lab 06/17/20  1503 05/14/20  1524 11/05/19  0912   HGB  --   --  14.0   BUN  --   --  20   Creatinine  --  0.79 0.90   Potassium  --   --  4.6   CHOLESTEROL  --  
 --  141   HDL  --   --  46*   CHOL/HDL  --   --  3.1   TRIGLYCERIDE  --   --  93   CALCULATED LDL  --   --  76   AST/SGOT  --   --  26   ALT/SGPT  --   --  32   TSH 2.309  --  2.561       ASSESSMENT/PLAN:    Donita Andrew is a 89 y/o female with cerebral microvascular disease, CKD stage III, HTN and HLD, who presents for follow-up with Cardiology.    #. PVCs, mild burden  #. HTN  #. HLD  #. Diastolic dysfunction  #. CKD, stage III  #. Obesity     She's doing well from a symptom standpoint, she's denying palpitations or new fatigue, SOB/HOUGH. Echo with a normal LVEF.    Recent repeat Holter has shown a significant improvement in degree of PVCs, from ~21% to slightly <2%, on B-blocker.     No current plans for proceeding with a coronary angiogram.     Continue Toprol XL 25 mg daily. And continue other cardiac medications: Atorvastatin 40 mg daily, aspirin 81 mg daily, Amlodipine 5 mg daily, Lasix 20 mg daily, Candesartan-HCTZ 32-2 mg/mg daily and daily Levothyroxine.     Blood pressure measure remain acceptable, in this 89 y/o female.     She's euvolemic on exam. She's encouraged to continue with her daily aerobic physical activity.    She's stating she would like to lose ~5 lbs. through diet and exercise, which I think is a good goal.    Planned follow-up with me in 6 months; sooner, if there are any clinical changes before then.    Chilango Rowe MD, FACC  [facility]      
severe agitation...

## 2021-07-04 NOTE — ED ADULT TRIAGE NOTE - BP NONINVASIVE DIASTOLIC (MM HG)
Addendum Note by Little Patino LP at 1/19/2021 12:30 PM     Author: Little Patino LP Service: Behavioral Author Type: Psychologist    Filed: 2/2/2021  3:36 PM Date of Service: 1/19/2021 12:30 PM Status: Signed    : Little Patino LP (Psychologist)    Encounter addended by: Little Patino LP on: 2/2/2021  3:36 PM      Actions taken: Clinical Note Signed       65

## 2023-03-07 NOTE — ED ADULT NURSE NOTE - NSHISCREENINGQ1_ED_A_ED
Goal Outcome Evaluation:       Overall Pt had no acute issue this shift. Slept through out the night. Denied discomfort. Due for X-ray at 1045 AM. Pt is continent of both bowel and bladder. Urinal at bedside this shift. Appears comfortable at the time of this report. Will continue with POC.       Patient's most recent vital signs are:     Vital signs:  BP: 133/62  Temp: 97.8  HR: 78  RR: 18  SpO2: 96 %     Patient does not have new respiratory symptoms.  Patient does not have new sore throat.  Patient does not have a fever greater than 99.5.                         No